# Patient Record
Sex: FEMALE | Race: WHITE | NOT HISPANIC OR LATINO
[De-identification: names, ages, dates, MRNs, and addresses within clinical notes are randomized per-mention and may not be internally consistent; named-entity substitution may affect disease eponyms.]

---

## 2017-02-05 ENCOUNTER — TRANSCRIPTION ENCOUNTER (OUTPATIENT)
Age: 72
End: 2017-02-05

## 2017-03-16 ENCOUNTER — APPOINTMENT (OUTPATIENT)
Dept: THORACIC SURGERY | Facility: CLINIC | Age: 72
End: 2017-03-16

## 2017-03-16 VITALS — WEIGHT: 155 LBS | BODY MASS INDEX: 27.46 KG/M2 | HEIGHT: 63 IN

## 2017-06-20 ENCOUNTER — APPOINTMENT (OUTPATIENT)
Dept: THORACIC SURGERY | Facility: CLINIC | Age: 72
End: 2017-06-20

## 2017-06-20 VITALS
HEIGHT: 63 IN | HEART RATE: 86 BPM | RESPIRATION RATE: 16 BRPM | BODY MASS INDEX: 27.46 KG/M2 | SYSTOLIC BLOOD PRESSURE: 144 MMHG | WEIGHT: 155 LBS | DIASTOLIC BLOOD PRESSURE: 74 MMHG | OXYGEN SATURATION: 94 %

## 2017-09-26 ENCOUNTER — APPOINTMENT (OUTPATIENT)
Dept: THORACIC SURGERY | Facility: CLINIC | Age: 72
End: 2017-09-26

## 2017-10-03 ENCOUNTER — APPOINTMENT (OUTPATIENT)
Dept: THORACIC SURGERY | Facility: CLINIC | Age: 72
End: 2017-10-03
Payer: MEDICARE

## 2017-10-03 VITALS
RESPIRATION RATE: 15 BRPM | WEIGHT: 152 LBS | SYSTOLIC BLOOD PRESSURE: 134 MMHG | HEIGHT: 63 IN | BODY MASS INDEX: 26.93 KG/M2 | HEART RATE: 72 BPM | OXYGEN SATURATION: 99 % | DIASTOLIC BLOOD PRESSURE: 84 MMHG | TEMPERATURE: 98.2 F

## 2017-10-03 PROCEDURE — 99215 OFFICE O/P EST HI 40 MIN: CPT

## 2017-10-03 RX ORDER — TRAZODONE HYDROCHLORIDE 100 MG/1
100 TABLET ORAL
Refills: 0 | Status: ACTIVE | COMMUNITY

## 2017-10-03 RX ORDER — ALPRAZOLAM 0.5 MG/1
0.5 TABLET ORAL
Refills: 0 | Status: ACTIVE | COMMUNITY

## 2018-01-16 ENCOUNTER — APPOINTMENT (OUTPATIENT)
Dept: THORACIC SURGERY | Facility: CLINIC | Age: 73
End: 2018-01-16
Payer: MEDICARE

## 2018-01-16 PROCEDURE — 99215 OFFICE O/P EST HI 40 MIN: CPT

## 2018-01-17 VITALS
SYSTOLIC BLOOD PRESSURE: 154 MMHG | OXYGEN SATURATION: 98 % | DIASTOLIC BLOOD PRESSURE: 80 MMHG | WEIGHT: 153 LBS | BODY MASS INDEX: 27.11 KG/M2 | HEIGHT: 63 IN | HEART RATE: 75 BPM

## 2018-04-17 ENCOUNTER — APPOINTMENT (OUTPATIENT)
Dept: THORACIC SURGERY | Facility: CLINIC | Age: 73
End: 2018-04-17
Payer: MEDICARE

## 2018-04-17 VITALS
DIASTOLIC BLOOD PRESSURE: 78 MMHG | HEART RATE: 64 BPM | HEIGHT: 63 IN | RESPIRATION RATE: 18 BRPM | SYSTOLIC BLOOD PRESSURE: 130 MMHG | WEIGHT: 153 LBS | OXYGEN SATURATION: 98 % | BODY MASS INDEX: 27.11 KG/M2

## 2018-04-17 PROCEDURE — 99213 OFFICE O/P EST LOW 20 MIN: CPT

## 2018-07-31 ENCOUNTER — APPOINTMENT (OUTPATIENT)
Dept: THORACIC SURGERY | Facility: CLINIC | Age: 73
End: 2018-07-31
Payer: MEDICARE

## 2018-07-31 VITALS
HEART RATE: 69 BPM | TEMPERATURE: 97.5 F | OXYGEN SATURATION: 98 % | WEIGHT: 155 LBS | BODY MASS INDEX: 27.46 KG/M2 | HEIGHT: 63 IN | SYSTOLIC BLOOD PRESSURE: 116 MMHG | DIASTOLIC BLOOD PRESSURE: 67 MMHG | RESPIRATION RATE: 18 BRPM

## 2018-07-31 PROCEDURE — 99213 OFFICE O/P EST LOW 20 MIN: CPT

## 2018-07-31 RX ORDER — AMLODIPINE BESYLATE 5 MG/1
5 TABLET ORAL
Refills: 0 | Status: ACTIVE | COMMUNITY

## 2018-10-01 ENCOUNTER — OUTPATIENT (OUTPATIENT)
Dept: OUTPATIENT SERVICES | Facility: HOSPITAL | Age: 73
LOS: 1 days | End: 2018-10-01
Payer: COMMERCIAL

## 2018-10-01 VITALS
WEIGHT: 154.1 LBS | RESPIRATION RATE: 16 BRPM | DIASTOLIC BLOOD PRESSURE: 66 MMHG | OXYGEN SATURATION: 99 % | SYSTOLIC BLOOD PRESSURE: 135 MMHG | HEIGHT: 62 IN | TEMPERATURE: 98 F | HEART RATE: 68 BPM

## 2018-10-01 DIAGNOSIS — Z01.818 ENCOUNTER FOR OTHER PREPROCEDURAL EXAMINATION: ICD-10-CM

## 2018-10-01 DIAGNOSIS — Z90.2 ACQUIRED ABSENCE OF LUNG [PART OF]: Chronic | ICD-10-CM

## 2018-10-01 DIAGNOSIS — M25.561 PAIN IN RIGHT KNEE: ICD-10-CM

## 2018-10-01 DIAGNOSIS — Z90.49 ACQUIRED ABSENCE OF OTHER SPECIFIED PARTS OF DIGESTIVE TRACT: Chronic | ICD-10-CM

## 2018-10-01 DIAGNOSIS — Z98.89 OTHER SPECIFIED POSTPROCEDURAL STATES: Chronic | ICD-10-CM

## 2018-10-01 DIAGNOSIS — M17.11 UNILATERAL PRIMARY OSTEOARTHRITIS, RIGHT KNEE: ICD-10-CM

## 2018-10-01 DIAGNOSIS — Z98.890 OTHER SPECIFIED POSTPROCEDURAL STATES: Chronic | ICD-10-CM

## 2018-10-01 LAB
ALBUMIN SERPL ELPH-MCNC: 3.9 G/DL — SIGNIFICANT CHANGE UP (ref 3.3–5)
ALP SERPL-CCNC: 78 U/L — SIGNIFICANT CHANGE UP (ref 40–120)
ALT FLD-CCNC: 23 U/L — SIGNIFICANT CHANGE UP (ref 12–78)
ANION GAP SERPL CALC-SCNC: 6 MMOL/L — SIGNIFICANT CHANGE UP (ref 5–17)
APTT BLD: 33 SEC — SIGNIFICANT CHANGE UP (ref 27.5–37.4)
AST SERPL-CCNC: 16 U/L — SIGNIFICANT CHANGE UP (ref 15–37)
BILIRUB SERPL-MCNC: 0.6 MG/DL — SIGNIFICANT CHANGE UP (ref 0.2–1.2)
BUN SERPL-MCNC: 21 MG/DL — SIGNIFICANT CHANGE UP (ref 7–23)
CALCIUM SERPL-MCNC: 8.6 MG/DL — SIGNIFICANT CHANGE UP (ref 8.5–10.1)
CHLORIDE SERPL-SCNC: 106 MMOL/L — SIGNIFICANT CHANGE UP (ref 96–108)
CO2 SERPL-SCNC: 29 MMOL/L — SIGNIFICANT CHANGE UP (ref 22–31)
CREAT SERPL-MCNC: 0.72 MG/DL — SIGNIFICANT CHANGE UP (ref 0.5–1.3)
GLUCOSE SERPL-MCNC: 118 MG/DL — HIGH (ref 70–99)
HBA1C BLD-MCNC: 5.7 % — HIGH (ref 4–5.6)
HCT VFR BLD CALC: 38.7 % — SIGNIFICANT CHANGE UP (ref 34.5–45)
HGB BLD-MCNC: 13.1 G/DL — SIGNIFICANT CHANGE UP (ref 11.5–15.5)
INR BLD: 1.03 RATIO — SIGNIFICANT CHANGE UP (ref 0.88–1.16)
MCHC RBC-ENTMCNC: 30.9 PG — SIGNIFICANT CHANGE UP (ref 27–34)
MCHC RBC-ENTMCNC: 33.9 GM/DL — SIGNIFICANT CHANGE UP (ref 32–36)
MCV RBC AUTO: 91.3 FL — SIGNIFICANT CHANGE UP (ref 80–100)
MRSA PCR RESULT.: SIGNIFICANT CHANGE UP
NRBC # BLD: 0 /100 WBCS — SIGNIFICANT CHANGE UP (ref 0–0)
PLATELET # BLD AUTO: 230 K/UL — SIGNIFICANT CHANGE UP (ref 150–400)
POTASSIUM SERPL-MCNC: 3.9 MMOL/L — SIGNIFICANT CHANGE UP (ref 3.5–5.3)
POTASSIUM SERPL-SCNC: 3.9 MMOL/L — SIGNIFICANT CHANGE UP (ref 3.5–5.3)
PROT SERPL-MCNC: 7.4 G/DL — SIGNIFICANT CHANGE UP (ref 6–8.3)
PROTHROM AB SERPL-ACNC: 11.2 SEC — SIGNIFICANT CHANGE UP (ref 9.8–12.7)
RBC # BLD: 4.24 M/UL — SIGNIFICANT CHANGE UP (ref 3.8–5.2)
RBC # FLD: 12.5 % — SIGNIFICANT CHANGE UP (ref 10.3–14.5)
S AUREUS DNA NOSE QL NAA+PROBE: SIGNIFICANT CHANGE UP
SODIUM SERPL-SCNC: 141 MMOL/L — SIGNIFICANT CHANGE UP (ref 135–145)
WBC # BLD: 7.8 K/UL — SIGNIFICANT CHANGE UP (ref 3.8–10.5)
WBC # FLD AUTO: 7.8 K/UL — SIGNIFICANT CHANGE UP (ref 3.8–10.5)

## 2018-10-01 PROCEDURE — 36415 COLL VENOUS BLD VENIPUNCTURE: CPT

## 2018-10-01 PROCEDURE — 73560 X-RAY EXAM OF KNEE 1 OR 2: CPT | Mod: 26,RT

## 2018-10-01 PROCEDURE — 86901 BLOOD TYPING SEROLOGIC RH(D): CPT

## 2018-10-01 PROCEDURE — 85610 PROTHROMBIN TIME: CPT

## 2018-10-01 PROCEDURE — 86900 BLOOD TYPING SEROLOGIC ABO: CPT

## 2018-10-01 PROCEDURE — 87640 STAPH A DNA AMP PROBE: CPT

## 2018-10-01 PROCEDURE — 73560 X-RAY EXAM OF KNEE 1 OR 2: CPT

## 2018-10-01 PROCEDURE — 85730 THROMBOPLASTIN TIME PARTIAL: CPT

## 2018-10-01 PROCEDURE — 93005 ELECTROCARDIOGRAM TRACING: CPT

## 2018-10-01 PROCEDURE — 83036 HEMOGLOBIN GLYCOSYLATED A1C: CPT

## 2018-10-01 PROCEDURE — 93010 ELECTROCARDIOGRAM REPORT: CPT | Mod: NC

## 2018-10-01 PROCEDURE — 85027 COMPLETE CBC AUTOMATED: CPT

## 2018-10-01 PROCEDURE — 87641 MR-STAPH DNA AMP PROBE: CPT

## 2018-10-01 PROCEDURE — 80053 COMPREHEN METABOLIC PANEL: CPT

## 2018-10-01 PROCEDURE — 86850 RBC ANTIBODY SCREEN: CPT

## 2018-10-01 PROCEDURE — G0463: CPT

## 2018-10-01 NOTE — H&P PST ADULT - NEGATIVE NEUROLOGICAL SYMPTOMS
no tremors/no paresthesias/no vertigo/no loss of sensation/no hemiparesis/no syncope/no difficulty walking/no confusion/no loss of consciousness/no generalized seizures/no transient paralysis/no focal seizures/no weakness/no headache/no facial palsy

## 2018-10-01 NOTE — H&P PST ADULT - NEGATIVE GENERAL SYMPTOMS
no chills/no weight gain/no polyphagia/no fatigue/no fever/no polydipsia/no sweating/no anorexia/no weight loss/no polyuria/no malaise

## 2018-10-01 NOTE — H&P PST ADULT - PSH
H/O endoscopy  April 2015  S/P cholecystectomy  25 years ago  S/P excision of lipoma  8/2015  S/P lobectomy of lung  right lobe with 2 nodules

## 2018-10-01 NOTE — H&P PST ADULT - HISTORY OF PRESENT ILLNESS
72 y/o female, PMH of HTN, hyperlipidemia,  Anxiety, and acid reflux, in PST with c/o pain in the right knee for a while. Also had a tumor on th right thigh which was removed 3 yrs ago, now in PST, scheduled for right total knee replacement.

## 2018-10-01 NOTE — H&P PST ADULT - NEGATIVE GASTROINTESTINAL SYMPTOMS
no constipation/no change in bowel habits/no abdominal pain/no nausea/no vomiting/no melena/no diarrhea

## 2018-10-01 NOTE — H&P PST ADULT - NSANTHOSAYNRD_GEN_A_CORE
No. JOSIAH screening performed.  STOP BANG Legend: 0-2 = LOW Risk; 3-4 = INTERMEDIATE Risk; 5-8 = HIGH Risk

## 2018-10-01 NOTE — H&P PST ADULT - NEGATIVE SKIN SYMPTOMS
no dryness/no brittle nails/no itching/no tumor/no hair loss/no change in size/color of mole/no pitted nails/no rash

## 2018-10-01 NOTE — H&P PST ADULT - ASSESSMENT
74 y/o female, PMH of HTN, hyperlipidemia,  Anxiety, and acid reflux, in PST with c/o pain in the right knee for a while. Also had a tumor on th right thigh which was removed 3 yrs ago, now in PST, scheduled for right total knee replacement.

## 2018-10-01 NOTE — H&P PST ADULT - PMH
Anxiety    Lal's esophagus    GERD (gastroesophageal reflux disease)    Hiatal hernia    Hyperlipidemia    IBS (irritable bowel syndrome)    Parotid gland enlargement  h/o biopsy 4 years ago and MRI 2015 with no changes  Postnasal drip    Solitary pulmonary nodule

## 2018-10-13 RX ORDER — FOLIC ACID 0.8 MG
1 TABLET ORAL DAILY
Qty: 0 | Refills: 0 | Status: DISCONTINUED | OUTPATIENT
Start: 2018-10-15 | End: 2018-10-18

## 2018-10-13 RX ORDER — FERROUS SULFATE 325(65) MG
325 TABLET ORAL
Qty: 0 | Refills: 0 | Status: DISCONTINUED | OUTPATIENT
Start: 2018-10-15 | End: 2018-10-18

## 2018-10-13 RX ORDER — ASCORBIC ACID 60 MG
500 TABLET,CHEWABLE ORAL
Qty: 0 | Refills: 0 | Status: DISCONTINUED | OUTPATIENT
Start: 2018-10-15 | End: 2018-10-18

## 2018-10-13 RX ORDER — DOCUSATE SODIUM 100 MG
100 CAPSULE ORAL THREE TIMES A DAY
Qty: 0 | Refills: 0 | Status: DISCONTINUED | OUTPATIENT
Start: 2018-10-15 | End: 2018-10-18

## 2018-10-13 RX ORDER — CELECOXIB 200 MG/1
200 CAPSULE ORAL
Qty: 0 | Refills: 0 | Status: DISCONTINUED | OUTPATIENT
Start: 2018-10-17 | End: 2018-10-18

## 2018-10-13 RX ORDER — ASPIRIN/CALCIUM CARB/MAGNESIUM 324 MG
325 TABLET ORAL
Qty: 0 | Refills: 0 | Status: DISCONTINUED | OUTPATIENT
Start: 2018-10-15 | End: 2018-10-18

## 2018-10-13 RX ORDER — HYDROMORPHONE HYDROCHLORIDE 2 MG/ML
2 INJECTION INTRAMUSCULAR; INTRAVENOUS; SUBCUTANEOUS EVERY 4 HOURS
Qty: 0 | Refills: 0 | Status: DISCONTINUED | OUTPATIENT
Start: 2018-10-15 | End: 2018-10-18

## 2018-10-13 RX ORDER — ACETAMINOPHEN 500 MG
650 TABLET ORAL EVERY 8 HOURS
Qty: 0 | Refills: 0 | Status: DISCONTINUED | OUTPATIENT
Start: 2018-10-16 | End: 2018-10-18

## 2018-10-13 RX ORDER — PANTOPRAZOLE SODIUM 20 MG/1
40 TABLET, DELAYED RELEASE ORAL DAILY
Qty: 0 | Refills: 0 | Status: DISCONTINUED | OUTPATIENT
Start: 2018-10-15 | End: 2018-10-18

## 2018-10-13 RX ORDER — HYDROMORPHONE HYDROCHLORIDE 2 MG/ML
4 INJECTION INTRAMUSCULAR; INTRAVENOUS; SUBCUTANEOUS EVERY 4 HOURS
Qty: 0 | Refills: 0 | Status: DISCONTINUED | OUTPATIENT
Start: 2018-10-15 | End: 2018-10-18

## 2018-10-13 RX ORDER — ONDANSETRON 8 MG/1
4 TABLET, FILM COATED ORAL EVERY 6 HOURS
Qty: 0 | Refills: 0 | Status: DISCONTINUED | OUTPATIENT
Start: 2018-10-15 | End: 2018-10-18

## 2018-10-13 RX ORDER — ATORVASTATIN CALCIUM 80 MG/1
10 TABLET, FILM COATED ORAL AT BEDTIME
Qty: 0 | Refills: 0 | Status: DISCONTINUED | OUTPATIENT
Start: 2018-10-15 | End: 2018-10-18

## 2018-10-13 RX ORDER — MORPHINE SULFATE 50 MG/1
2 CAPSULE, EXTENDED RELEASE ORAL EVERY 4 HOURS
Qty: 0 | Refills: 0 | Status: DISCONTINUED | OUTPATIENT
Start: 2018-10-15 | End: 2018-10-18

## 2018-10-14 ENCOUNTER — TRANSCRIPTION ENCOUNTER (OUTPATIENT)
Age: 73
End: 2018-10-14

## 2018-10-15 ENCOUNTER — RESULT REVIEW (OUTPATIENT)
Age: 73
End: 2018-10-15

## 2018-10-15 ENCOUNTER — INPATIENT (INPATIENT)
Facility: HOSPITAL | Age: 73
LOS: 2 days | Discharge: HOME | DRG: 470 | End: 2018-10-18
Attending: ORTHOPAEDIC SURGERY | Admitting: ORTHOPAEDIC SURGERY
Payer: COMMERCIAL

## 2018-10-15 VITALS
DIASTOLIC BLOOD PRESSURE: 58 MMHG | WEIGHT: 154.1 LBS | SYSTOLIC BLOOD PRESSURE: 115 MMHG | OXYGEN SATURATION: 95 % | TEMPERATURE: 98 F | RESPIRATION RATE: 14 BRPM | HEIGHT: 62 IN | HEART RATE: 68 BPM

## 2018-10-15 DIAGNOSIS — Z90.2 ACQUIRED ABSENCE OF LUNG [PART OF]: Chronic | ICD-10-CM

## 2018-10-15 DIAGNOSIS — F41.9 ANXIETY DISORDER, UNSPECIFIED: ICD-10-CM

## 2018-10-15 DIAGNOSIS — M17.11 UNILATERAL PRIMARY OSTEOARTHRITIS, RIGHT KNEE: ICD-10-CM

## 2018-10-15 DIAGNOSIS — Z98.89 OTHER SPECIFIED POSTPROCEDURAL STATES: Chronic | ICD-10-CM

## 2018-10-15 DIAGNOSIS — Z98.890 OTHER SPECIFIED POSTPROCEDURAL STATES: Chronic | ICD-10-CM

## 2018-10-15 DIAGNOSIS — I10 ESSENTIAL (PRIMARY) HYPERTENSION: ICD-10-CM

## 2018-10-15 DIAGNOSIS — K21.9 GASTRO-ESOPHAGEAL REFLUX DISEASE WITHOUT ESOPHAGITIS: ICD-10-CM

## 2018-10-15 DIAGNOSIS — Z90.49 ACQUIRED ABSENCE OF OTHER SPECIFIED PARTS OF DIGESTIVE TRACT: Chronic | ICD-10-CM

## 2018-10-15 LAB
ABO RH CONFIRMATION: SIGNIFICANT CHANGE UP
HCT VFR BLD CALC: 36.8 % — SIGNIFICANT CHANGE UP (ref 34.5–45)
HGB BLD-MCNC: 12.1 G/DL — SIGNIFICANT CHANGE UP (ref 11.5–15.5)
MCHC RBC-ENTMCNC: 31 PG — SIGNIFICANT CHANGE UP (ref 27–34)
MCHC RBC-ENTMCNC: 32.9 GM/DL — SIGNIFICANT CHANGE UP (ref 32–36)
MCV RBC AUTO: 94.4 FL — SIGNIFICANT CHANGE UP (ref 80–100)
NRBC # BLD: 0 /100 WBCS — SIGNIFICANT CHANGE UP (ref 0–0)
PLATELET # BLD AUTO: 197 K/UL — SIGNIFICANT CHANGE UP (ref 150–400)
RBC # BLD: 3.9 M/UL — SIGNIFICANT CHANGE UP (ref 3.8–5.2)
RBC # FLD: 12.1 % — SIGNIFICANT CHANGE UP (ref 10.3–14.5)
WBC # BLD: 10.85 K/UL — HIGH (ref 3.8–10.5)
WBC # FLD AUTO: 10.85 K/UL — HIGH (ref 3.8–10.5)

## 2018-10-15 PROCEDURE — 73562 X-RAY EXAM OF KNEE 3: CPT | Mod: 26,RT

## 2018-10-15 PROCEDURE — 88305 TISSUE EXAM BY PATHOLOGIST: CPT | Mod: 26

## 2018-10-15 PROCEDURE — 88311 DECALCIFY TISSUE: CPT | Mod: 26

## 2018-10-15 RX ORDER — CEFAZOLIN SODIUM 1 G
2000 VIAL (EA) INJECTION ONCE
Qty: 0 | Refills: 0 | Status: COMPLETED | OUTPATIENT
Start: 2018-10-15 | End: 2018-10-15

## 2018-10-15 RX ORDER — ACETAMINOPHEN 500 MG
1000 TABLET ORAL ONCE
Qty: 0 | Refills: 0 | Status: COMPLETED | OUTPATIENT
Start: 2018-10-16 | End: 2018-10-16

## 2018-10-15 RX ORDER — TRAZODONE HCL 50 MG
100 TABLET ORAL AT BEDTIME
Qty: 0 | Refills: 0 | Status: DISCONTINUED | OUTPATIENT
Start: 2018-10-15 | End: 2018-10-18

## 2018-10-15 RX ORDER — SODIUM CHLORIDE 9 MG/ML
1000 INJECTION, SOLUTION INTRAVENOUS
Qty: 0 | Refills: 0 | Status: DISCONTINUED | OUTPATIENT
Start: 2018-10-15 | End: 2018-10-15

## 2018-10-15 RX ORDER — HYDROMORPHONE HYDROCHLORIDE 2 MG/ML
0.5 INJECTION INTRAMUSCULAR; INTRAVENOUS; SUBCUTANEOUS
Qty: 0 | Refills: 0 | Status: DISCONTINUED | OUTPATIENT
Start: 2018-10-15 | End: 2018-10-15

## 2018-10-15 RX ORDER — AMLODIPINE BESYLATE 2.5 MG/1
5 TABLET ORAL DAILY
Qty: 0 | Refills: 0 | Status: DISCONTINUED | OUTPATIENT
Start: 2018-10-15 | End: 2018-10-18

## 2018-10-15 RX ORDER — CEFAZOLIN SODIUM 1 G
2000 VIAL (EA) INJECTION EVERY 8 HOURS
Qty: 0 | Refills: 0 | Status: COMPLETED | OUTPATIENT
Start: 2018-10-15 | End: 2018-10-16

## 2018-10-15 RX ORDER — ALPRAZOLAM 0.25 MG
0.25 TABLET ORAL ONCE
Qty: 0 | Refills: 0 | Status: DISCONTINUED | OUTPATIENT
Start: 2018-10-15 | End: 2018-10-18

## 2018-10-15 RX ORDER — METOCLOPRAMIDE HCL 10 MG
10 TABLET ORAL ONCE
Qty: 0 | Refills: 0 | Status: COMPLETED | OUTPATIENT
Start: 2018-10-15 | End: 2018-10-15

## 2018-10-15 RX ORDER — BUPIVACAINE 13.3 MG/ML
20 INJECTION, SUSPENSION, LIPOSOMAL INFILTRATION ONCE
Qty: 0 | Refills: 0 | Status: COMPLETED | OUTPATIENT
Start: 2018-10-15 | End: 2018-10-15

## 2018-10-15 RX ORDER — ACETAMINOPHEN 500 MG
1000 TABLET ORAL ONCE
Qty: 0 | Refills: 0 | Status: COMPLETED | OUTPATIENT
Start: 2018-10-15 | End: 2018-10-15

## 2018-10-15 RX ORDER — SODIUM CHLORIDE 9 MG/ML
1000 INJECTION, SOLUTION INTRAVENOUS
Qty: 0 | Refills: 0 | Status: DISCONTINUED | OUTPATIENT
Start: 2018-10-15 | End: 2018-10-16

## 2018-10-15 RX ORDER — ALPRAZOLAM 0.25 MG
0.5 TABLET ORAL AT BEDTIME
Qty: 0 | Refills: 0 | Status: DISCONTINUED | OUTPATIENT
Start: 2018-10-15 | End: 2018-10-18

## 2018-10-15 RX ADMIN — SODIUM CHLORIDE 110 MILLILITER(S): 9 INJECTION, SOLUTION INTRAVENOUS at 16:23

## 2018-10-15 RX ADMIN — Medication 500 MILLIGRAM(S): at 18:15

## 2018-10-15 RX ADMIN — Medication 100 MILLIGRAM(S): at 23:28

## 2018-10-15 RX ADMIN — Medication 400 MILLIGRAM(S): at 20:55

## 2018-10-15 RX ADMIN — Medication 10 MILLIGRAM(S): at 16:43

## 2018-10-15 RX ADMIN — Medication 1000 MILLIGRAM(S): at 21:24

## 2018-10-15 RX ADMIN — SODIUM CHLORIDE 75 MILLILITER(S): 9 INJECTION, SOLUTION INTRAVENOUS at 18:17

## 2018-10-15 RX ADMIN — ATORVASTATIN CALCIUM 10 MILLIGRAM(S): 80 TABLET, FILM COATED ORAL at 21:03

## 2018-10-15 RX ADMIN — Medication 325 MILLIGRAM(S): at 18:15

## 2018-10-15 RX ADMIN — Medication 100 MILLIGRAM(S): at 21:03

## 2018-10-15 RX ADMIN — HYDROMORPHONE HYDROCHLORIDE 4 MILLIGRAM(S): 2 INJECTION INTRAMUSCULAR; INTRAVENOUS; SUBCUTANEOUS at 23:35

## 2018-10-15 RX ADMIN — AMLODIPINE BESYLATE 5 MILLIGRAM(S): 2.5 TABLET ORAL at 21:03

## 2018-10-15 RX ADMIN — Medication 0.5 MILLIGRAM(S): at 23:28

## 2018-10-15 RX ADMIN — SODIUM CHLORIDE 75 MILLILITER(S): 9 INJECTION, SOLUTION INTRAVENOUS at 11:21

## 2018-10-15 RX ADMIN — Medication 325 MILLIGRAM(S): at 18:16

## 2018-10-15 NOTE — CONSULT NOTE ADULT - SUBJECTIVE AND OBJECTIVE BOX
Patient is a 73y old  Female who presents with a chief complaint of R TKA (15 Oct 2018 16:58)  feels well, without complaints      INTERVAL HPI/OVERNIGHT EVENTS:  T(C): 36.6 (10-15-18 @ 17:41), Max: 36.7 (10-15-18 @ 15:12)  HR: 75 (10-15-18 @ 17:41) (63 - 88)  BP: 112/68 (10-15-18 @ 17:41) (106/83 - 129/60)  RR: 17 (10-15-18 @ 17:41) (12 - 17)  SpO2: 97% (10-15-18 @ 17:42) (86% - 99%)  Wt(kg): --  I&O's Summary    15 Oct 2018 07:01  -  15 Oct 2018 19:44  --------------------------------------------------------  IN: 645 mL / OUT: 50 mL / NET: 595 mL        PAST MEDICAL & SURGICAL HISTORY:  Parotid gland enlargement: h/o biopsy 4 years ago and MRI 2015 with no changes  Postnasal drip  Solitary pulmonary nodule  Hiatal hernia  Anxiety  IBS (irritable bowel syndrome)  Hyperlipidemia  GERD (gastroesophageal reflux disease)  Lal's esophagus  S/P lobectomy of lung: right lobe with 2 nodules  S/P excision of lipoma: 8/2015  S/P cholecystectomy: 25 years ago  H/O endoscopy: April 2015      SOCIAL HISTORY  Alcohol: neg  Tobacco: neg  Illicit substance use: neg      FAMILY HISTORY:    Home Medications:  acetaminophen 325 mg oral tablet: 2 tab(s) orally every 6 hours, As needed, Mild Pain (1 - 3) (15 Oct 2018 10:03)  ALPRAZolam 0.5 mg oral tablet: 1 tab(s) orally once a day, As needed, anxiety (15 Oct 2018 10:03)  amLODIPine 5 mg oral tablet: 1 tab(s) orally once a day (15 Oct 2018 10:03)  atorvastatin 10 mg oral tablet: 1 tab(s) orally once a day (at bedtime) (15 Oct 2018 10:03)  Motrin 800 mg oral tablet: as needed (15 Oct 2018 10:03)  multivitamin: 1 tab(s) orally once a day  last dose to be 11/14/2016 (15 Oct 2018 10:03)  pantoprazole 40 mg oral delayed release tablet: 1 tab(s) orally 4x/week (15 Oct 2018 10:03)  Probiotic Formula oral capsule: 1 cap(s) orally once a day (15 Oct 2018 10:03)  traZODone 100 mg oral tablet: orally once a day (at bedtime) (15 Oct 2018 10:03)  Tylenol Allergy Sinus Caplet: 1 tab(s)  every 4 hours, As Needed (15 Oct 2018 10:03)        LABS:                        12.1   10.85 )-----------( 197      ( 15 Oct 2018 18:07 )             36.8               CAPILLARY BLOOD GLUCOSE      POCT Blood Glucose.: 110 mg/dL (15 Oct 2018 15:22)  POCT Blood Glucose.: 98 mg/dL (15 Oct 2018 09:52)            MEDICATIONS  (STANDING):  acetaminophen  IVPB .. 1000 milliGRAM(s) IV Intermittent once  ALPRAZolam 0.5 milliGRAM(s) Oral at bedtime  amLODIPine   Tablet 5 milliGRAM(s) Oral daily  ascorbic acid 500 milliGRAM(s) Oral two times a day  aspirin enteric coated 325 milliGRAM(s) Oral two times a day  atorvastatin 10 milliGRAM(s) Oral at bedtime  ceFAZolin   IVPB 2000 milliGRAM(s) IV Intermittent every 8 hours  docusate sodium 100 milliGRAM(s) Oral three times a day  ferrous    sulfate 325 milliGRAM(s) Oral three times a day with meals  folic acid 1 milliGRAM(s) Oral daily  lactated ringers. 1000 milliLiter(s) (75 mL/Hr) IV Continuous <Continuous>  multivitamin 1 Tablet(s) Oral daily  pantoprazole    Tablet 40 milliGRAM(s) Oral daily  traZODone 100 milliGRAM(s) Oral at bedtime    MEDICATIONS  (PRN):  ALPRAZolam 0.25 milliGRAM(s) Oral once PRN anxiety  HYDROmorphone   Tablet 2 milliGRAM(s) Oral every 4 hours PRN Moderate Pain (4 - 6)  HYDROmorphone   Tablet 4 milliGRAM(s) Oral every 4 hours PRN Severe Pain (7 - 10)  morphine  - Injectable 2 milliGRAM(s) IV Push every 4 hours PRN Severe Pain (7 - 10)  ondansetron Injectable 4 milliGRAM(s) IV Push every 6 hours PRN Nausea and/or Vomiting      REVIEW OF SYSTEMS:  CONSTITUTIONAL: No fever, weight loss, or fatigue  EYES: No eye pain, visual disturbances, or discharge  ENMT:  No difficulty hearing, tinnitus, vertigo; No sinus or throat pain  NECK: No pain or stiffness  RESPIRATORY: No cough, wheezing, chills or hemoptysis; No shortness of breath  CARDIOVASCULAR: No chest pain, palpitations, dizziness, or leg swelling  GASTROINTESTINAL: No abdominal or epigastric pain. No nausea, vomiting, or hematemesis; No diarrhea or constipation. No melena or hematochezia.  GENITOURINARY: No dysuria, frequency, hematuria, or incontinence  NEUROLOGICAL: No headaches, memory loss, loss of strength, numbness, or tremors  SKIN: No itching, burning, rashes, or lesions   LYMPH NODES: No enlarged glands  ENDOCRINE: No heat or cold intolerance; No hair loss  MUSCULOSKELETAL: chronic r knee pain  PSYCHIATRIC: No depression, anxiety, mood swings, or difficulty sleeping  HEME/LYMPH: No easy bruising, or bleeding gums  ALLERY AND IMMUNOLOGIC: No hives or eczema    RADIOLOGY & ADDITIONAL TESTS:    Imaging Personally Reviewed:  [ ] YES  [ ] NO    Consultant(s) Notes Reviewed:  [ ] YES  [ ] NO        PHYSICAL EXAM:  GENERAL: NAD, well-groomed, well-developed  HEAD:  Atraumatic, Normocephalic  EYES: EOMI, PERRLA, conjunctiva and sclera clear  ENMT: No tonsillar erythema, exudates, or enlargement; Moist mucous membranes, Good dentition, No lesions  NECK: Supple, No JVD, Normal thyroid  NERVOUS SYSTEM:  Alert & Oriented X3, Good concentration; Motor Strength 5/5 B/L upper and lower extremities; DTRs 2+ intact and symmetric  CHEST/LUNG: Clear to percussion bilaterally; No rales, rhonchi, wheezing, or rubs  HEART: Regular rate and rhythm; No murmurs, rubs, or gallops  ABDOMEN: Soft, Nontender, Nondistended; Bowel sounds present  EXTREMITIES:  2+ Peripheral Pulses, No clubbing, cyanosis, or edema  LYMPH: No lymphadenopathy noted  SKIN: No rashes or lesions    Care Discussed with Consultants/Other Providers [ ] YES  [ ] NO

## 2018-10-15 NOTE — PHYSICAL THERAPY INITIAL EVALUATION ADULT - ADDITIONAL COMMENTS
Pt lives with family in private home, 0 RYAN.  Pt reports she was independent in all ADLs prior surgery, ambulated without an assistive device, has tub shower, no seat, no grab bars. Pt owns SAC and RW

## 2018-10-15 NOTE — PROGRESS NOTE ADULT - ASSESSMENT
A/P: 73 y F s/p R TKA POD 0    Analgesia  DVT ppx  WBAT RLE with assistive devices as needed  Encourage IS  FU labs  PT/OT  DC planning    Will discuss with attending and advise if plan changes.

## 2018-10-15 NOTE — PROGRESS NOTE ADULT - SUBJECTIVE AND OBJECTIVE BOX
Post operative check    Patient seen and examined at bedside. Pain is well controlled. Patient tolerated procedure well. No other complaints at this time.         PE:  Vital Signs Last 24 Hrs  T(C): 36.6 (15 Oct 2018 16:10), Max: 36.7 (15 Oct 2018 15:12)  T(F): 97.9 (15 Oct 2018 16:10), Max: 98.1 (15 Oct 2018 15:12)  HR: 63 (15 Oct 2018 16:25) (63 - 88)  BP: 106/83 (15 Oct 2018 16:25) (106/83 - 129/60)  RR: 14 (15 Oct 2018 16:25) (12 - 15)  SpO2: 96% (15 Oct 2018 16:25) (95% - 99%)    GEN: NAD, Resting comfortably    RLE :    Aquacel dressing c/d/i  SILT L3-S1  EHL/FHL/TA/GSC intact   DP pulse 2+  Compartments soft and compressible  no calf tenderness

## 2018-10-15 NOTE — CHART NOTE - NSCHARTNOTEFT_GEN_A_CORE
Was called to examine patient after a one time 02 Sat reading of 89. Patient seen and examined at bedside. Patient was taken off 2L NC and for 5 minutes. During the whole duration while talking to the patient, Vitals were taken which showed HR in the 80's and 02 sat above 99 off of NC. Patient has a hx of Left Lower Lobe lobectomy 2 years ago, however denies having to use oxygen at home. Patient currently denies SOB, Chest pain. No calf tenderness b/l. Will continue to monitor and follow.

## 2018-10-15 NOTE — PHYSICAL THERAPY INITIAL EVALUATION ADULT - RANGE OF MOTION EXAMINATION, REHAB EVAL
R Knee: 0-90/bilateral upper extremity ROM was WNL (within normal limits)/Left LE ROM was WFL (within functional limits)

## 2018-10-15 NOTE — BRIEF OPERATIVE NOTE - PROCEDURE
<<-----Click on this checkbox to enter Procedure Total knee arthroplasty  10/15/2018    Active  KATYA

## 2018-10-16 ENCOUNTER — TRANSCRIPTION ENCOUNTER (OUTPATIENT)
Age: 73
End: 2018-10-16

## 2018-10-16 LAB
ANION GAP SERPL CALC-SCNC: 7 MMOL/L — SIGNIFICANT CHANGE UP (ref 5–17)
BUN SERPL-MCNC: 12 MG/DL — SIGNIFICANT CHANGE UP (ref 7–23)
CALCIUM SERPL-MCNC: 8.5 MG/DL — SIGNIFICANT CHANGE UP (ref 8.5–10.1)
CHLORIDE SERPL-SCNC: 104 MMOL/L — SIGNIFICANT CHANGE UP (ref 96–108)
CO2 SERPL-SCNC: 27 MMOL/L — SIGNIFICANT CHANGE UP (ref 22–31)
CREAT SERPL-MCNC: 0.67 MG/DL — SIGNIFICANT CHANGE UP (ref 0.5–1.3)
GLUCOSE SERPL-MCNC: 142 MG/DL — HIGH (ref 70–99)
HCT VFR BLD CALC: 35.7 % — SIGNIFICANT CHANGE UP (ref 34.5–45)
HGB BLD-MCNC: 12.1 G/DL — SIGNIFICANT CHANGE UP (ref 11.5–15.5)
MCHC RBC-ENTMCNC: 30.9 PG — SIGNIFICANT CHANGE UP (ref 27–34)
MCHC RBC-ENTMCNC: 33.9 GM/DL — SIGNIFICANT CHANGE UP (ref 32–36)
MCV RBC AUTO: 91.1 FL — SIGNIFICANT CHANGE UP (ref 80–100)
NRBC # BLD: 0 /100 WBCS — SIGNIFICANT CHANGE UP (ref 0–0)
PLATELET # BLD AUTO: 243 K/UL — SIGNIFICANT CHANGE UP (ref 150–400)
POTASSIUM SERPL-MCNC: 4.7 MMOL/L — SIGNIFICANT CHANGE UP (ref 3.5–5.3)
POTASSIUM SERPL-SCNC: 4.7 MMOL/L — SIGNIFICANT CHANGE UP (ref 3.5–5.3)
RBC # BLD: 3.92 M/UL — SIGNIFICANT CHANGE UP (ref 3.8–5.2)
RBC # FLD: 11.9 % — SIGNIFICANT CHANGE UP (ref 10.3–14.5)
SODIUM SERPL-SCNC: 138 MMOL/L — SIGNIFICANT CHANGE UP (ref 135–145)
WBC # BLD: 9.19 K/UL — SIGNIFICANT CHANGE UP (ref 3.8–10.5)
WBC # FLD AUTO: 9.19 K/UL — SIGNIFICANT CHANGE UP (ref 3.8–10.5)

## 2018-10-16 RX ORDER — SODIUM CHLORIDE 9 MG/ML
1000 INJECTION INTRAMUSCULAR; INTRAVENOUS; SUBCUTANEOUS
Qty: 0 | Refills: 0 | Status: DISCONTINUED | OUTPATIENT
Start: 2018-10-16 | End: 2018-10-17

## 2018-10-16 RX ADMIN — ONDANSETRON 4 MILLIGRAM(S): 8 TABLET, FILM COATED ORAL at 08:33

## 2018-10-16 RX ADMIN — HYDROMORPHONE HYDROCHLORIDE 4 MILLIGRAM(S): 2 INJECTION INTRAMUSCULAR; INTRAVENOUS; SUBCUTANEOUS at 14:10

## 2018-10-16 RX ADMIN — Medication 0.5 MILLIGRAM(S): at 21:44

## 2018-10-16 RX ADMIN — Medication 100 MILLIGRAM(S): at 05:37

## 2018-10-16 RX ADMIN — Medication 100 MILLIGRAM(S): at 13:27

## 2018-10-16 RX ADMIN — Medication 1000 MILLIGRAM(S): at 12:37

## 2018-10-16 RX ADMIN — HYDROMORPHONE HYDROCHLORIDE 4 MILLIGRAM(S): 2 INJECTION INTRAMUSCULAR; INTRAVENOUS; SUBCUTANEOUS at 08:32

## 2018-10-16 RX ADMIN — Medication 100 MILLIGRAM(S): at 21:44

## 2018-10-16 RX ADMIN — Medication 650 MILLIGRAM(S): at 22:15

## 2018-10-16 RX ADMIN — AMLODIPINE BESYLATE 5 MILLIGRAM(S): 2.5 TABLET ORAL at 21:44

## 2018-10-16 RX ADMIN — HYDROMORPHONE HYDROCHLORIDE 4 MILLIGRAM(S): 2 INJECTION INTRAMUSCULAR; INTRAVENOUS; SUBCUTANEOUS at 15:05

## 2018-10-16 RX ADMIN — HYDROMORPHONE HYDROCHLORIDE 4 MILLIGRAM(S): 2 INJECTION INTRAMUSCULAR; INTRAVENOUS; SUBCUTANEOUS at 00:05

## 2018-10-16 RX ADMIN — Medication 650 MILLIGRAM(S): at 21:44

## 2018-10-16 RX ADMIN — Medication 325 MILLIGRAM(S): at 08:32

## 2018-10-16 RX ADMIN — Medication 325 MILLIGRAM(S): at 18:27

## 2018-10-16 RX ADMIN — SODIUM CHLORIDE 100 MILLILITER(S): 9 INJECTION INTRAMUSCULAR; INTRAVENOUS; SUBCUTANEOUS at 14:14

## 2018-10-16 RX ADMIN — Medication 400 MILLIGRAM(S): at 12:13

## 2018-10-16 RX ADMIN — Medication 325 MILLIGRAM(S): at 05:37

## 2018-10-16 RX ADMIN — HYDROMORPHONE HYDROCHLORIDE 4 MILLIGRAM(S): 2 INJECTION INTRAMUSCULAR; INTRAVENOUS; SUBCUTANEOUS at 09:30

## 2018-10-16 RX ADMIN — Medication 500 MILLIGRAM(S): at 18:27

## 2018-10-16 RX ADMIN — Medication 325 MILLIGRAM(S): at 12:08

## 2018-10-16 RX ADMIN — ATORVASTATIN CALCIUM 10 MILLIGRAM(S): 80 TABLET, FILM COATED ORAL at 21:44

## 2018-10-16 RX ADMIN — Medication 1 TABLET(S): at 12:08

## 2018-10-16 RX ADMIN — PANTOPRAZOLE SODIUM 40 MILLIGRAM(S): 20 TABLET, DELAYED RELEASE ORAL at 12:08

## 2018-10-16 RX ADMIN — Medication 1 MILLIGRAM(S): at 12:08

## 2018-10-16 RX ADMIN — ONDANSETRON 4 MILLIGRAM(S): 8 TABLET, FILM COATED ORAL at 14:11

## 2018-10-16 RX ADMIN — Medication 500 MILLIGRAM(S): at 05:37

## 2018-10-16 NOTE — PROGRESS NOTE ADULT - SUBJECTIVE AND OBJECTIVE BOX
The patient was interviewed and evaluated. OOB    73y Female    T(C): 37.1 (10-16-18 @ 07:24), Max: 37.1 (10-16-18 @ 07:24)  HR: 74 (10-16-18 @ 07:24) (60 - 89)  BP: 101/63 (10-16-18 @ 09:35) (97/68 - 136/68)  RR: 18 (10-16-18 @ 07:24) (12 - 18)  SpO2: 97% (10-16-18 @ 07:24) (86% - 100%)  Wt(kg): --    No Nausea/vomiting, recall, sore throat or headache.    No anesthesia related complaints or sequelae.

## 2018-10-16 NOTE — DISCHARGE NOTE ADULT - MEDICATION SUMMARY - MEDICATIONS TO STOP TAKING
I will STOP taking the medications listed below when I get home from the hospital:    Motrin 800 mg oral tablet  -- as needed

## 2018-10-16 NOTE — DISCHARGE NOTE ADULT - PATIENT PORTAL LINK FT
You can access the [facility] Patient Portal, offered by [facility], by registering with the following website: [email or web address]

## 2018-10-16 NOTE — PROGRESS NOTE ADULT - SUBJECTIVE AND OBJECTIVE BOX
VANESSA CORADO 73y Female  MRN-[id number]     ORTHOPEDIC SURGERY / DR. ROMERO    POD # 1    Vital Signs Last 24 Hrs  T(C): 36.8 (16 Oct 2018 04:59), Max: 36.9 (15 Oct 2018 23:14)  T(F): 98.3 (16 Oct 2018 04:59), Max: 98.4 (15 Oct 2018 23:14)  HR: 60 (16 Oct 2018 04:59) (60 - 89)  BP: 97/68 (16 Oct 2018 04:59) (97/68 - 129/60)  BP(mean): --  RR: 16 (16 Oct 2018 04:59) (12 - 17)  SpO2: 100% (16 Oct 2018 04:59) (86% - 100%)    RIGHT KNEE :    DRESSING DRY AND INTACT  GOOD MOTOR TO RIGHT LOWER EXTREMITY  NEURO-VASCULAR STATUS INTACT  NO CALF TENDERNESS    Hemoglobin: 12.1 (10-16 @ 05:13)  Hemoglobin: 12.1 (10-15 @ 18:07)    Hematocrit: 35.7 (10-16 @ 05:13)  Hematocrit: 36.8 (10-15 @ 18:07)    10-16    138  |  104  |  12  ----------------------------<  142<H>  4.7   |  27  |  0.67    Ca    8.5      16 Oct 2018 05:13    ASSESSMENT &  PLAN:  POD # 1 S/P  RIGHT TOTAL KNEE  REPLACEMENT    WEIGHT  BEARING AS TOLERATED, OOB AND AMBULATE, PHYSICAL THERAPY   DVT PROPHYLAXIS  MG PO BID  INCENTIVE SPIROMETRY   DISCHARGE PLANNING TO HOME WITH HOME CARE

## 2018-10-16 NOTE — OCCUPATIONAL THERAPY INITIAL EVALUATION ADULT - ADDITIONAL COMMENTS
Pt lives in a house with no steps to enter. Bedroom and bathroom on main floor. Pt reports that her dtr lives upstairs. Pt has a bathtub with curtain. Pt does not own any DME. Pt reports that her family will be available to assist her upon d/c home. Pt declined to order a commode due to limited space on either side of toilet and will get a raised toilet seat on her own.

## 2018-10-16 NOTE — DISCHARGE NOTE ADULT - CARE PROVIDER_API CALL
Hero Redding), Orthopaedic Surgery  [address] [place] [address]  Phone: [phone]  Fax: [phone]

## 2018-10-16 NOTE — DISCHARGE NOTE ADULT - MEDICATION SUMMARY - MEDICATIONS TO TAKE
I will START or STAY ON the medications listed below when I get home from the hospital:    acetaminophen 325 mg oral tablet  -- 2 tab(s) by mouth every 6 hours, As needed, Mild Pain (1 - 3)  -- Indication: For PAIN, HOME MEDICATION     celecoxib 200 mg oral capsule  -- 1 cap(s) by mouth 2 times a day  -- Indication: For PAIN     HYDROmorphone 2 mg oral tablet  -- 1 tab(s) by mouth every 6 hours, As Needed -for severe pain MDD:4   -- Indication: For PAIN     aspirin 325 mg oral delayed release tablet  -- 1 tab(s) by mouth 2 times a day   -- Indication: For DVT PROPHYLAXIS, PREVENT BLOOD CLOTS IN LEGS     traZODone 100 mg oral tablet  -- orally once a day (at bedtime)  -- Indication: For HOME MEDICATION     atorvastatin 10 mg oral tablet  -- 1 tab(s) by mouth once a day (at bedtime)  -- Indication: For HOME MEDICATION     ALPRAZolam 0.5 mg oral tablet  -- 1 tab(s) by mouth once a day, As needed, anxiety  -- Indication: For HOME MEDICATION     amLODIPine 5 mg oral tablet  -- 1 tab(s) by mouth once a day  -- Indication: For HOME MEDICATION     Probiotic Formula oral capsule  -- 1 cap(s) by mouth once a day  -- Indication: For HOME MEDICATION     pantoprazole 40 mg oral delayed release tablet  -- 1 tab(s) by mouth 4x/week  -- Indication: For HOME MEDICATION     Tylenol Allergy Sinus Caplet  -- 1 tab(s)  every 4 hours, As Needed  -- Indication: For HOME MEDICATION     multivitamin  -- 1 tab(s) by mouth once a day  last dose to be 11/14/2016  -- Indication: For HOME MEDICATION

## 2018-10-16 NOTE — OCCUPATIONAL THERAPY INITIAL EVALUATION ADULT - ORIENTATION, REHAB EVAL
oriented to person, place, time and situation/Pt instructed in weight bearing status, DME needs, d/c plan and role of OT. Pt provided with knee replacement education pamphlet for follow up care. Pt educated regarding fall prevention in hospital and recommendation to use call bell/ask for assistance with all ADL's/transfers etc.

## 2018-10-16 NOTE — DISCHARGE NOTE ADULT - PLAN OF CARE
RECOVER FROM SURGERY, PHYSICAL THERAPY WEIGHT BEARING AS TOLERATED.  FOLLOW UP WITH DR. ROMERO NEXT THURSDAY 10/25/2018 CALL [phone] FOR AN APPOINTMENT.  KEEP KNEE AQUACEL  DRESSING  ON DRY AND CLEAN, IT WILL BE CHANGED OR REMOVED ON YOUR NEXT OFFICE VISIT .

## 2018-10-16 NOTE — DISCHARGE NOTE ADULT - HOSPITAL COURSE
THIS IS A CASE OF A 73 O MALE EVALUATED IN THE OFFICE DUE TO RIGHT KNEE PAIN.    PAST MEDICAL HISTORY: ANXIETY, GERD, HERNANDEZ'S ESOPHAGUS, HIATAL HERNIA, HYPERLIPIDEMIA, HTN, IBS, POST NASAL DRIP     HOSPITAL COURSE: AFTER THE RISK AND BENEFITS OF SURGICAL INTERVENTION IN DETAILS WERE DISCUSSED WITH THE PATIENT, A CONSENT WAS OBTAINED. AFTER OBTAINING MEDICAL CLEARANCE AND PREOPERATIVE EVALUATION, THE PATIENT WAS TAKEN TO THE OPERATING ROOM ON 10/15/2018 AND THE PATIENT UNDERWENT A  RIGHT TOTAL KNEE REPLACEMENT. POSTOPERATIVE PHASE, THE PATIENT WAS ANTICOAGULATED WITH ASPIRIN AND WAS GIVEN 24 HOURS OF IV ANTIBIOTICS. A SOCIAL SERVICE CONSULT WAS OBTAINED FOR DISCHARGE PLANNING. A PHYSICAL THERAPY CONSULT WAS OBTAINED FOR  WEIGHT BEARING AS TOLERATED.   DUE TO ANEMIA OF ACUTE BLOOD LOSS POST OP  IRON SUPPLEMENT GIVEN.     DISPOSITION : HOME,  FOLLOW UP WITH DR. ROMERO AS OUTPATIENT.

## 2018-10-16 NOTE — DISCHARGE NOTE ADULT - CARE PLAN
Principal Discharge DX:	Osteoarthritis of right knee, unspecified osteoarthritis type  Goal:	RECOVER FROM SURGERY, PHYSICAL THERAPY  Assessment and plan of treatment:	WEIGHT BEARING AS TOLERATED.  FOLLOW UP WITH DR. ROMERO NEXT THURSDAY 10/25/2018 CALL [phone] FOR AN APPOINTMENT.  KEEP KNEE AQUACEL  DRESSING  ON DRY AND CLEAN, IT WILL BE CHANGED OR REMOVED ON YOUR NEXT OFFICE VISIT .

## 2018-10-16 NOTE — PROGRESS NOTE ADULT - SUBJECTIVE AND OBJECTIVE BOX
Patient is a 73y old  Female who presents with a chief complaint of RIGHT KNEE PAIN  RIGHT KNEE END STAGE OSTEOARTHRITIS  RIGHT TOTAL KNEE REPLACEMENT (16 Oct 2018 05:58)  feels well    INTERVAL HPI/OVERNIGHT EVENTS:  T(C): 37.1 (10-16-18 @ 07:24), Max: 37.1 (10-16-18 @ 07:24)  HR: 74 (10-16-18 @ 07:24) (60 - 89)  BP: 101/63 (10-16-18 @ 09:35) (97/68 - 136/68)  RR: 18 (10-16-18 @ 07:24) (12 - 18)  SpO2: 97% (10-16-18 @ 07:24) (86% - 100%)  Wt(kg): --  I&O's Summary    15 Oct 2018 07:01  -  16 Oct 2018 07:00  --------------------------------------------------------  IN: 645 mL / OUT: 1300 mL / NET: -655 mL        LABS:                        12.1   9.19  )-----------( 243      ( 16 Oct 2018 05:13 )             35.7     10-16    138  |  104  |  12  ----------------------------<  142<H>  4.7   |  27  |  0.67    Ca    8.5      16 Oct 2018 05:13          CAPILLARY BLOOD GLUCOSE      POCT Blood Glucose.: 110 mg/dL (15 Oct 2018 15:22)            MEDICATIONS  (STANDING):  acetaminophen   Tablet .. 650 milliGRAM(s) Oral every 8 hours  ALPRAZolam 0.5 milliGRAM(s) Oral at bedtime  amLODIPine   Tablet 5 milliGRAM(s) Oral daily  ascorbic acid 500 milliGRAM(s) Oral two times a day  aspirin enteric coated 325 milliGRAM(s) Oral two times a day  atorvastatin 10 milliGRAM(s) Oral at bedtime  docusate sodium 100 milliGRAM(s) Oral three times a day  ferrous    sulfate 325 milliGRAM(s) Oral three times a day with meals  folic acid 1 milliGRAM(s) Oral daily  multivitamin 1 Tablet(s) Oral daily  pantoprazole    Tablet 40 milliGRAM(s) Oral daily  sodium chloride 0.9%. 1000 milliLiter(s) (100 mL/Hr) IV Continuous <Continuous>  traZODone 100 milliGRAM(s) Oral at bedtime    MEDICATIONS  (PRN):  ALPRAZolam 0.25 milliGRAM(s) Oral once PRN anxiety  HYDROmorphone   Tablet 2 milliGRAM(s) Oral every 4 hours PRN Moderate Pain (4 - 6)  HYDROmorphone   Tablet 4 milliGRAM(s) Oral every 4 hours PRN Severe Pain (7 - 10)  morphine  - Injectable 2 milliGRAM(s) IV Push every 4 hours PRN Severe Pain (7 - 10)  ondansetron Injectable 4 milliGRAM(s) IV Push every 6 hours PRN Nausea and/or Vomiting      REVIEW OF SYSTEMS:  CONSTITUTIONAL: No fever, weight loss, or fatigue  EYES: No eye pain, visual disturbances, or discharge  ENMT:  No difficulty hearing, tinnitus, vertigo; No sinus or throat pain  NECK: No pain or stiffness  RESPIRATORY: No cough, wheezing, chills or hemoptysis; No shortness of breath  CARDIOVASCULAR: No chest pain, palpitations, dizziness, or leg swelling  GASTROINTESTINAL: No abdominal or epigastric pain. No nausea, vomiting, or hematemesis; No diarrhea or constipation. No melena or hematochezia.  GENITOURINARY: No dysuria, frequency, hematuria, or incontinence  NEUROLOGICAL: No headaches, memory loss, loss of strength, numbness, or tremors  SKIN: No itching, burning, rashes, or lesions   LYMPH NODES: No enlarged glands  ENDOCRINE: No heat or cold intolerance; No hair loss  MUSCULOSKELETAL: No joint pain or swelling; No muscle, back, or extremity pain  PSYCHIATRIC: No depression, anxiety, mood swings, or difficulty sleeping  HEME/LYMPH: No easy bruising, or bleeding gums  ALLERY AND IMMUNOLOGIC: No hives or eczema    RADIOLOGY & ADDITIONAL TESTS:    Imaging Personally Reviewed:  [ ] YES  [ ] NO    Consultant(s) Notes Reviewed:  [ ] YES  [ ] NO    PHYSICAL EXAM:  GENERAL: NAD, well-groomed, well-developed  HEAD:  Atraumatic, Normocephalic  EYES: EOMI, PERRLA, conjunctiva and sclera clear  ENMT: No tonsillar erythema, exudates, or enlargement; Moist mucous membranes, Good dentition, No lesions  NECK: Supple, No JVD, Normal thyroid  NERVOUS SYSTEM:  Alert & Oriented X3, Good concentration; Motor Strength 5/5 B/L upper and lower extremities; DTRs 2+ intact and symmetric  CHEST/LUNG: Clear to percussion bilaterally; No rales, rhonchi, wheezing, or rubs  HEART: Regular rate and rhythm; No murmurs, rubs, or gallops  ABDOMEN: Soft, Nontender, Nondistended; Bowel sounds present  EXTREMITIES:  2+ Peripheral Pulses, No clubbing, cyanosis, or edema  LYMPH: No lymphadenopathy noted  SKIN: No rashes or lesions    Care Discussed with Consultants/Other Providers [ ] YES  [ ] NO

## 2018-10-17 DIAGNOSIS — Z71.89 OTHER SPECIFIED COUNSELING: ICD-10-CM

## 2018-10-17 LAB
HCT VFR BLD CALC: 34.4 % — LOW (ref 34.5–45)
HGB BLD-MCNC: 11.4 G/DL — LOW (ref 11.5–15.5)
MCHC RBC-ENTMCNC: 30.7 PG — SIGNIFICANT CHANGE UP (ref 27–34)
MCHC RBC-ENTMCNC: 33.1 GM/DL — SIGNIFICANT CHANGE UP (ref 32–36)
MCV RBC AUTO: 92.7 FL — SIGNIFICANT CHANGE UP (ref 80–100)
NRBC # BLD: 0 /100 WBCS — SIGNIFICANT CHANGE UP (ref 0–0)
PLATELET # BLD AUTO: 241 K/UL — SIGNIFICANT CHANGE UP (ref 150–400)
RBC # BLD: 3.71 M/UL — LOW (ref 3.8–5.2)
RBC # FLD: 12.2 % — SIGNIFICANT CHANGE UP (ref 10.3–14.5)
SURGICAL PATHOLOGY FINAL REPORT - CH: SIGNIFICANT CHANGE UP
WBC # BLD: 11.67 K/UL — HIGH (ref 3.8–10.5)
WBC # FLD AUTO: 11.67 K/UL — HIGH (ref 3.8–10.5)

## 2018-10-17 RX ORDER — ASPIRIN/CALCIUM CARB/MAGNESIUM 324 MG
1 TABLET ORAL
Qty: 60 | Refills: 0
Start: 2018-10-17 | End: 2018-11-15

## 2018-10-17 RX ORDER — HYDROMORPHONE HYDROCHLORIDE 2 MG/ML
1 INJECTION INTRAMUSCULAR; INTRAVENOUS; SUBCUTANEOUS
Qty: 20 | Refills: 0
Start: 2018-10-17 | End: 2018-10-21

## 2018-10-17 RX ORDER — CELECOXIB 200 MG/1
1 CAPSULE ORAL
Qty: 0 | Refills: 0 | DISCHARGE
Start: 2018-10-17

## 2018-10-17 RX ORDER — IBUPROFEN 200 MG
0 TABLET ORAL
Qty: 0 | Refills: 0 | COMMUNITY

## 2018-10-17 RX ADMIN — Medication 325 MILLIGRAM(S): at 17:22

## 2018-10-17 RX ADMIN — CELECOXIB 200 MILLIGRAM(S): 200 CAPSULE ORAL at 17:59

## 2018-10-17 RX ADMIN — Medication 500 MILLIGRAM(S): at 17:23

## 2018-10-17 RX ADMIN — Medication 325 MILLIGRAM(S): at 17:23

## 2018-10-17 RX ADMIN — HYDROMORPHONE HYDROCHLORIDE 4 MILLIGRAM(S): 2 INJECTION INTRAMUSCULAR; INTRAVENOUS; SUBCUTANEOUS at 08:20

## 2018-10-17 RX ADMIN — Medication 650 MILLIGRAM(S): at 15:00

## 2018-10-17 RX ADMIN — Medication 100 MILLIGRAM(S): at 06:04

## 2018-10-17 RX ADMIN — Medication 325 MILLIGRAM(S): at 11:17

## 2018-10-17 RX ADMIN — ATORVASTATIN CALCIUM 10 MILLIGRAM(S): 80 TABLET, FILM COATED ORAL at 21:36

## 2018-10-17 RX ADMIN — Medication 650 MILLIGRAM(S): at 14:28

## 2018-10-17 RX ADMIN — CELECOXIB 200 MILLIGRAM(S): 200 CAPSULE ORAL at 06:31

## 2018-10-17 RX ADMIN — Medication 650 MILLIGRAM(S): at 22:00

## 2018-10-17 RX ADMIN — ONDANSETRON 4 MILLIGRAM(S): 8 TABLET, FILM COATED ORAL at 08:20

## 2018-10-17 RX ADMIN — PANTOPRAZOLE SODIUM 40 MILLIGRAM(S): 20 TABLET, DELAYED RELEASE ORAL at 11:17

## 2018-10-17 RX ADMIN — HYDROMORPHONE HYDROCHLORIDE 4 MILLIGRAM(S): 2 INJECTION INTRAMUSCULAR; INTRAVENOUS; SUBCUTANEOUS at 08:50

## 2018-10-17 RX ADMIN — Medication 325 MILLIGRAM(S): at 06:04

## 2018-10-17 RX ADMIN — Medication 100 MILLIGRAM(S): at 21:36

## 2018-10-17 RX ADMIN — HYDROMORPHONE HYDROCHLORIDE 4 MILLIGRAM(S): 2 INJECTION INTRAMUSCULAR; INTRAVENOUS; SUBCUTANEOUS at 19:43

## 2018-10-17 RX ADMIN — Medication 650 MILLIGRAM(S): at 06:30

## 2018-10-17 RX ADMIN — Medication 100 MILLIGRAM(S): at 14:28

## 2018-10-17 RX ADMIN — CELECOXIB 200 MILLIGRAM(S): 200 CAPSULE ORAL at 06:04

## 2018-10-17 RX ADMIN — AMLODIPINE BESYLATE 5 MILLIGRAM(S): 2.5 TABLET ORAL at 21:36

## 2018-10-17 RX ADMIN — Medication 1 MILLIGRAM(S): at 11:17

## 2018-10-17 RX ADMIN — ONDANSETRON 4 MILLIGRAM(S): 8 TABLET, FILM COATED ORAL at 19:46

## 2018-10-17 RX ADMIN — Medication 650 MILLIGRAM(S): at 06:04

## 2018-10-17 RX ADMIN — HYDROMORPHONE HYDROCHLORIDE 4 MILLIGRAM(S): 2 INJECTION INTRAMUSCULAR; INTRAVENOUS; SUBCUTANEOUS at 20:01

## 2018-10-17 RX ADMIN — Medication 500 MILLIGRAM(S): at 06:04

## 2018-10-17 RX ADMIN — Medication 325 MILLIGRAM(S): at 08:04

## 2018-10-17 RX ADMIN — CELECOXIB 200 MILLIGRAM(S): 200 CAPSULE ORAL at 17:23

## 2018-10-17 RX ADMIN — Medication 0.5 MILLIGRAM(S): at 21:36

## 2018-10-17 RX ADMIN — Medication 650 MILLIGRAM(S): at 21:36

## 2018-10-17 RX ADMIN — Medication 1 TABLET(S): at 11:17

## 2018-10-17 NOTE — PROGRESS NOTE ADULT - SUBJECTIVE AND OBJECTIVE BOX
VANESSA CORADO 73y Female  MRN-[id number]     ORTHOPEDIC SURGERY / DR. ROMERO    POD # 2    Vital Signs Last 24 Hrs  T(C): 37 (17 Oct 2018 04:03), Max: 37.1 (16 Oct 2018 07:24)  T(F): 98.6 (17 Oct 2018 04:03), Max: 98.7 (16 Oct 2018 07:24)  HR: 93 (17 Oct 2018 04:03) (65 - 93)  BP: 127/67 (17 Oct 2018 04:03) (101/63 - 136/68)  BP(mean): --  RR: 18 (17 Oct 2018 04:03) (16 - 18)  SpO2: 91% (17 Oct 2018 04:03) (91% - 98%)    RIGHT KNEE :    DRESSING DRY AND INTACT  SOME EDEMA  GOOD MOTOR TO RIGHT LOWER EXTREMITY  NEURO-VASCULAR STATUS INTACT  NO CALF TENDERNESS    Hemoglobin: 12.1 (10-16 @ 05:13)  Hemoglobin: 12.1 (10-15 @ 18:07)    Hematocrit: 35.7 (10-16 @ 05:13)  Hematocrit: 36.8 (10-15 @ 18:07)    10-16    138  |  104  |  12  ----------------------------<  142<H>  4.7   |  27  |  0.67    Ca    8.5      16 Oct 2018 05:13    ASSESSMENT &  PLAN:  POD # 2 S/P  RIGHT TOTAL KNEE  REPLACEMENT    WEIGHT  BEARING AS TOLERATED, OOB AND AMBULATE, PHYSICAL THERAPY   DVT PROPHYLAXIS  MG PO BID  INCENTIVE SPIROMETRY   DISCHARGE PLANNING TO HOME WITH HOME CARE

## 2018-10-17 NOTE — PROGRESS NOTE ADULT - SUBJECTIVE AND OBJECTIVE BOX
Patient is a 73y old  Female who presents with a chief complaint of RIGHT KNEE PAIN  RIGHT KNEE END STAGE OSTEOARTHRITIS  RIGHT TOTAL KNEE REPLACEMENT (16 Oct 2018 05:58)  feels better today.  less discomfort    INTERVAL HPI/OVERNIGHT EVENTS:  T(C): 37.2 (10-17-18 @ 12:39), Max: 37.2 (10-17-18 @ 12:39)  HR: 79 (10-17-18 @ 12:39) (65 - 93)  BP: 114/68 (10-17-18 @ 12:39) (114/68 - 129/67)  RR: 18 (10-17-18 @ 12:39) (16 - 18)  SpO2: 95% (10-17-18 @ 12:39) (91% - 98%)  Wt(kg): --  I&O's Summary    16 Oct 2018 07:01  -  17 Oct 2018 07:00  --------------------------------------------------------  IN: 3300 mL / OUT: 1975 mL / NET: 1325 mL        LABS:                        11.4   11.67 )-----------( 241      ( 17 Oct 2018 06:22 )             34.4     10-16    138  |  104  |  12  ----------------------------<  142<H>  4.7   |  27  |  0.67    Ca    8.5      16 Oct 2018 05:13          MEDICATIONS  (STANDING):  acetaminophen   Tablet .. 650 milliGRAM(s) Oral every 8 hours  ALPRAZolam 0.5 milliGRAM(s) Oral at bedtime  amLODIPine   Tablet 5 milliGRAM(s) Oral daily  ascorbic acid 500 milliGRAM(s) Oral two times a day  aspirin enteric coated 325 milliGRAM(s) Oral two times a day  atorvastatin 10 milliGRAM(s) Oral at bedtime  celecoxib 200 milliGRAM(s) Oral two times a day  docusate sodium 100 milliGRAM(s) Oral three times a day  ferrous    sulfate 325 milliGRAM(s) Oral three times a day with meals  folic acid 1 milliGRAM(s) Oral daily  multivitamin 1 Tablet(s) Oral daily  pantoprazole    Tablet 40 milliGRAM(s) Oral daily  traZODone 100 milliGRAM(s) Oral at bedtime    MEDICATIONS  (PRN):  ALPRAZolam 0.25 milliGRAM(s) Oral once PRN anxiety  HYDROmorphone   Tablet 2 milliGRAM(s) Oral every 4 hours PRN Moderate Pain (4 - 6)  HYDROmorphone   Tablet 4 milliGRAM(s) Oral every 4 hours PRN Severe Pain (7 - 10)  morphine  - Injectable 2 milliGRAM(s) IV Push every 4 hours PRN Severe Pain (7 - 10)  ondansetron Injectable 4 milliGRAM(s) IV Push every 6 hours PRN Nausea and/or Vomiting      REVIEW OF SYSTEMS:  CONSTITUTIONAL: No fever, weight loss, or fatigue  EYES: No eye pain, visual disturbances, or discharge  ENMT:  No difficulty hearing, tinnitus, vertigo; No sinus or throat pain  NECK: No pain or stiffness  RESPIRATORY: No cough, wheezing, chills or hemoptysis; No shortness of breath  CARDIOVASCULAR: No chest pain, palpitations, dizziness, or leg swelling  GASTROINTESTINAL: No abdominal or epigastric pain. No nausea, vomiting, or hematemesis; No diarrhea or constipation. No melena or hematochezia.  GENITOURINARY: No dysuria, frequency, hematuria, or incontinence  NEUROLOGICAL: No headaches, memory loss, loss of strength, numbness, or tremors  SKIN: No itching, burning, rashes, or lesions   LYMPH NODES: No enlarged glands  ENDOCRINE: No heat or cold intolerance; No hair loss  MUSCULOSKELETAL: chronic knee pain  PSYCHIATRIC: No depression, anxiety, mood swings, or difficulty sleeping  HEME/LYMPH: No easy bruising, or bleeding gums  ALLERY AND IMMUNOLOGIC: No hives or eczema    RADIOLOGY & ADDITIONAL TESTS:    Imaging Personally Reviewed:  [ ] YES  [ ] NO    Consultant(s) Notes Reviewed:  [ ] YES  [ ] NO    PHYSICAL EXAM:  GENERAL: NAD, well-groomed, well-developed  HEAD:  Atraumatic, Normocephalic  EYES: EOMI, PERRLA, conjunctiva and sclera clear  ENMT: No tonsillar erythema, exudates, or enlargement; Moist mucous membranes, Good dentition, No lesions  NECK: Supple, No JVD, Normal thyroid  NERVOUS SYSTEM:  Alert & Oriented X3, Good concentration; Motor Strength 5/5 B/L upper and lower extremities; DTRs 2+ intact and symmetric  CHEST/LUNG: Clear to percussion bilaterally; No rales, rhonchi, wheezing, or rubs  HEART: Regular rate and rhythm; No murmurs, rubs, or gallops  ABDOMEN: Soft, Nontender, Nondistended; Bowel sounds present  EXTREMITIES:  2+ Peripheral Pulses, No clubbing, cyanosis, or edema  LYMPH: No lymphadenopathy noted  SKIN: No rashes or lesions    Care Discussed with Consultants/Other Providers [ ] YES  [ ] NO

## 2018-10-17 NOTE — PROGRESS NOTE ADULT - PROBLEM SELECTOR PLAN 5
advance care planning discussed at length  pt advised to provide health care proxy document w appointed health care agent

## 2018-10-18 VITALS
HEART RATE: 92 BPM | TEMPERATURE: 98 F | DIASTOLIC BLOOD PRESSURE: 71 MMHG | RESPIRATION RATE: 16 BRPM | OXYGEN SATURATION: 92 % | SYSTOLIC BLOOD PRESSURE: 118 MMHG

## 2018-10-18 RX ADMIN — Medication 500 MILLIGRAM(S): at 05:45

## 2018-10-18 RX ADMIN — Medication 1 MILLIGRAM(S): at 12:14

## 2018-10-18 RX ADMIN — Medication 650 MILLIGRAM(S): at 05:45

## 2018-10-18 RX ADMIN — Medication 100 MILLIGRAM(S): at 05:45

## 2018-10-18 RX ADMIN — Medication 325 MILLIGRAM(S): at 12:15

## 2018-10-18 RX ADMIN — Medication 1 TABLET(S): at 12:15

## 2018-10-18 RX ADMIN — CELECOXIB 200 MILLIGRAM(S): 200 CAPSULE ORAL at 06:15

## 2018-10-18 RX ADMIN — CELECOXIB 200 MILLIGRAM(S): 200 CAPSULE ORAL at 05:45

## 2018-10-18 RX ADMIN — PANTOPRAZOLE SODIUM 40 MILLIGRAM(S): 20 TABLET, DELAYED RELEASE ORAL at 12:14

## 2018-10-18 RX ADMIN — Medication 650 MILLIGRAM(S): at 06:15

## 2018-10-18 RX ADMIN — Medication 325 MILLIGRAM(S): at 05:45

## 2018-10-18 RX ADMIN — Medication 325 MILLIGRAM(S): at 08:21

## 2018-10-18 NOTE — PROGRESS NOTE ADULT - SUBJECTIVE AND OBJECTIVE BOX
VANESSA CORADO 73y Female  MRN-[id number]     ORTHOPEDIC SURGERY / DR. ROMERO    POD # 3    Vital Signs Last 24 Hrs  T(C): 36.9 (17 Oct 2018 23:42), Max: 37.2 (17 Oct 2018 12:39)  T(F): 98.4 (17 Oct 2018 23:42), Max: 98.9 (17 Oct 2018 12:39)  HR: 83 (17 Oct 2018 23:42) (79 - 87)  BP: 111/63 (17 Oct 2018 23:42) (111/63 - 154/67)  BP(mean): --  RR: 16 (17 Oct 2018 23:42) (16 - 18)  SpO2: 94% (17 Oct 2018 23:42) (94% - 96%)    RIGHT KNEE :    WOUND DRY AND INTACT  SOME EDEMA  GOOD MOTOR TO  RIGHT LOWER EXTREMITY  NEURO-VASCULAR STATUS INTACT  NO CALF TENDERNESS    Hemoglobin: 11.4 (10-17 @ 06:22)  Hemoglobin: 12.1 (10-16 @ 05:13)  Hemoglobin: 12.1 (10-15 @ 18:07)    Hematocrit: 34.4 (10-17 @ 06:22)  Hematocrit: 35.7 (10-16 @ 05:13)  Hematocrit: 36.8 (10-15 @ 18:07)    ASSESSMENT &  PLAN:  POD # 3 S/P  RIGHT TOTAL KNEE  REPLACEMENT    WEIGHT  BEARING AS TOLERATED, OOB AND AMBULATE, PHYSICAL THERAPY   DVT PROPHYLAXIS  MG PO BID  INCENTIVE SPIROMETRY   DISCHARGE PLANNING TO HOME WITH HOME CARE TODAY   NEW AQUACEL DRESSING APPLIED

## 2018-11-11 PROCEDURE — C1887: CPT

## 2018-11-11 PROCEDURE — 97535 SELF CARE MNGMENT TRAINING: CPT

## 2018-11-11 PROCEDURE — 80048 BASIC METABOLIC PNL TOTAL CA: CPT

## 2018-11-11 PROCEDURE — C1776: CPT

## 2018-11-11 PROCEDURE — 85027 COMPLETE CBC AUTOMATED: CPT

## 2018-11-11 PROCEDURE — 97116 GAIT TRAINING THERAPY: CPT

## 2018-11-11 PROCEDURE — 36415 COLL VENOUS BLD VENIPUNCTURE: CPT

## 2018-11-11 PROCEDURE — 97165 OT EVAL LOW COMPLEX 30 MIN: CPT

## 2018-11-11 PROCEDURE — 88311 DECALCIFY TISSUE: CPT

## 2018-11-11 PROCEDURE — 97530 THERAPEUTIC ACTIVITIES: CPT

## 2018-11-11 PROCEDURE — 82962 GLUCOSE BLOOD TEST: CPT

## 2018-11-11 PROCEDURE — 97161 PT EVAL LOW COMPLEX 20 MIN: CPT

## 2018-11-11 PROCEDURE — C1889: CPT

## 2018-11-11 PROCEDURE — 73562 X-RAY EXAM OF KNEE 3: CPT

## 2018-11-11 PROCEDURE — C1713: CPT

## 2018-11-11 PROCEDURE — 88305 TISSUE EXAM BY PATHOLOGIST: CPT

## 2018-11-11 PROCEDURE — 97112 NEUROMUSCULAR REEDUCATION: CPT

## 2018-11-27 ENCOUNTER — APPOINTMENT (OUTPATIENT)
Dept: THORACIC SURGERY | Facility: CLINIC | Age: 73
End: 2018-11-27
Payer: MEDICARE

## 2018-11-27 VITALS
SYSTOLIC BLOOD PRESSURE: 141 MMHG | OXYGEN SATURATION: 98 % | WEIGHT: 149 LBS | RESPIRATION RATE: 18 BRPM | BODY MASS INDEX: 26.4 KG/M2 | TEMPERATURE: 98.2 F | HEIGHT: 63 IN | DIASTOLIC BLOOD PRESSURE: 84 MMHG | HEART RATE: 81 BPM

## 2018-11-27 DIAGNOSIS — Z96.651 PRESENCE OF RIGHT ARTIFICIAL KNEE JOINT: ICD-10-CM

## 2018-11-27 PROCEDURE — 99213 OFFICE O/P EST LOW 20 MIN: CPT

## 2019-05-28 ENCOUNTER — APPOINTMENT (OUTPATIENT)
Dept: THORACIC SURGERY | Facility: CLINIC | Age: 74
End: 2019-05-28
Payer: MEDICARE

## 2019-05-28 PROCEDURE — 99214 OFFICE O/P EST MOD 30 MIN: CPT

## 2019-05-29 VITALS
HEART RATE: 72 BPM | HEIGHT: 63 IN | SYSTOLIC BLOOD PRESSURE: 151 MMHG | TEMPERATURE: 97.4 F | BODY MASS INDEX: 26.4 KG/M2 | DIASTOLIC BLOOD PRESSURE: 84 MMHG | OXYGEN SATURATION: 96 % | RESPIRATION RATE: 16 BRPM | WEIGHT: 149 LBS

## 2019-05-31 NOTE — HISTORY OF PRESENT ILLNESS
[FreeTextEntry1] : Kym Jung is a 73 year old female who presents for follow up S/P uniportal left VATS, left lower lobectomy on 11/21/16 for a [id number] adenocarcinoma, papillary predominant. \par \par Chest CT done 5/22/19 revealed:\par - s/p LLLobectomy\par - mild centrilobular pulmonary edema\par - no focal pulmonary masses or nodules\par - stable subcentimeter hypodense nodule right thyroid node\par - stable small 1 cm nodule of left adrenal gland\par \par Chest CT done 11/20/18 revealed:\par - volume loss within the left hemithorax consistent with prior left lung resection\par - minor fibrotic/discoid atelectatic changes are the left base are again noted\par - emphysematous changes are again seen\par - a prominent lymph node within the prevascular space of the mediastinum is unchanged in size and appearance\par - less prominent mediastinal lymph nodes described are also unchanged\par - the hilar regions are grossly unchanged on the basis of this noncontrast study\par - small hiatal hernia is again noted\par  \par She returns today and reports that she feels well, but admits to abdominal pain related to IBS and right knee pain.  She denies any fever, chills, cough, shortness of breath, chest pain, hemoptysis, or recent illness.

## 2019-05-31 NOTE — PHYSICAL EXAM
[Sclera] : the sclera and conjunctiva were normal [Neck Appearance] : the appearance of the neck was normal [] : the neck was supple [Respiration, Rhythm And Depth] : normal respiratory rhythm and effort [Neck Cervical Mass (___cm)] : no neck mass was observed [Auscultation Breath Sounds / Voice Sounds] : lungs were clear to auscultation bilaterally [Heart Rate And Rhythm] : heart rate was normal and rhythm regular [Examination Of The Chest] : the chest was normal in appearance [Heart Sounds] : normal S1 and S2 [Abdomen Tenderness] : non-tender [Cervical Lymph Nodes Enlarged Posterior Bilaterally] : posterior cervical [Abdomen Soft] : soft [Cervical Lymph Nodes Enlarged Anterior Bilaterally] : anterior cervical [Supraclavicular Lymph Nodes Enlarged Bilaterally] : supraclavicular [No CVA Tenderness] : no ~M costovertebral angle tenderness [Abnormal Walk] : normal gait [No Focal Deficits] : no focal deficits [Skin Color & Pigmentation] : normal skin color and pigmentation [Oriented To Time, Place, And Person] : oriented to person, place, and time [Impaired Insight] : insight and judgment were intact [Affect] : the affect was normal [FreeTextEntry1] : ,

## 2019-05-31 NOTE — REVIEW OF SYSTEMS
[Abdominal Pain] : abdominal pain [Diarrhea] : diarrhea [Constipation] : constipation [Joint Pain] : joint pain [Negative] : Heme/Lymph [FreeTextEntry7] : hx of IBS [FreeTextEntry9] : Right knee pain, does not require pain medication

## 2019-05-31 NOTE — ASSESSMENT
[FreeTextEntry1] : Kym Jung is a 73 year old female who presents for follow up S/P uniportal left VATS, left lower lobectomy on 11/21/16 for a [id number] adenocarcinoma, papillary predominant. \par \par Chest CT done 5/22/19 revealed:\par - s/p LLLobectomy\par - mild centrilobular pulmonary edema\par - no focal pulmonary masses or nodules\par - stable subcentimeter hypodense nodule right thyroid node\par - stable small 1 cm nodule of left adrenal gland\par \par Chest CT done 11/20/18 revealed:\par - volume loss within the left hemithorax consistent with prior left lung resection\par - minor fibrotic/discoid atelectatic changes are the left base are again noted\par - emphysematous changes are again seen\par - a prominent lymph node within the prevascular space of the mediastinum is unchanged in size and appearance\par - less prominent mediastinal lymph nodes described are also unchanged\par - the hilar regions are grossly unchanged on the basis of this noncontrast study\par - small hiatal hernia is again noted\par \par I have reviewed the patient's medical records and diagnostic images during the time of this office visit, and I have made the following recommendation: \par 1.  follow up in 6 months with CXR.\par \par Written by Gloria Chinchilla NP, acting as a scribe for MARTIN TOVAR MD.\par \par The documentation recorded by the scribe accurately reflects the service I personally performed and the decisions made by me. MARTIN TOVAR MD\par

## 2019-12-03 ENCOUNTER — APPOINTMENT (OUTPATIENT)
Dept: THORACIC SURGERY | Facility: CLINIC | Age: 74
End: 2019-12-03
Payer: MEDICARE

## 2019-12-03 VITALS
TEMPERATURE: 98 F | BODY MASS INDEX: 27.1 KG/M2 | HEART RATE: 73 BPM | SYSTOLIC BLOOD PRESSURE: 143 MMHG | OXYGEN SATURATION: 96 % | DIASTOLIC BLOOD PRESSURE: 84 MMHG | WEIGHT: 153 LBS | RESPIRATION RATE: 16 BRPM

## 2019-12-03 PROCEDURE — 99214 OFFICE O/P EST MOD 30 MIN: CPT

## 2019-12-03 RX ORDER — ASPIRIN 81 MG
81 TABLET, DELAYED RELEASE (ENTERIC COATED) ORAL
Refills: 0 | Status: DISCONTINUED | COMMUNITY
End: 2019-12-03

## 2019-12-03 NOTE — PHYSICAL EXAM
[Sclera] : the sclera and conjunctiva were normal [Extraocular Movements] : extraocular movements were intact [Neck Appearance] : the appearance of the neck was normal [Neck Cervical Mass (___cm)] : no neck mass was observed [Respiration, Rhythm And Depth] : normal respiratory rhythm and effort [] : no respiratory distress [Jugular Venous Distention Increased] : there was no jugular-venous distention [Auscultation Breath Sounds / Voice Sounds] : lungs were clear to auscultation bilaterally [Exaggerated Use Of Accessory Muscles For Inspiration] : no accessory muscle use [Diminished Respiratory Excursion] : normal chest expansion [Heart Rate And Rhythm] : heart rate was normal and rhythm regular [Abdomen Soft] : soft [Bowel Sounds] : normal bowel sounds [Abdomen Tenderness] : non-tender [Cervical Lymph Nodes Enlarged Posterior Bilaterally] : posterior cervical [Cervical Lymph Nodes Enlarged Anterior Bilaterally] : anterior cervical [Supraclavicular Lymph Nodes Enlarged Bilaterally] : supraclavicular [Abnormal Walk] : normal gait [Oriented To Time, Place, And Person] : oriented to person, place, and time [No Focal Deficits] : no focal deficits [Skin Color & Pigmentation] : normal skin color and pigmentation [Impaired Insight] : insight and judgment were intact [Affect] : the affect was normal [Mood] : the mood was normal

## 2019-12-06 NOTE — HISTORY OF PRESENT ILLNESS
[FreeTextEntry1] : Ms. Jung is a 74 year old female who presents today for follow up.  She is s/p uniportal Left VATS, LLLobectomy on 11/21/16, pathology revealed T1aN0 adenocarcinoma, papillary predominant. \par \par At previous visit in May 2019, CT Chest reviewed which revealed mild centrilobular pulmonary edema, no focal pulmonary masses or nodules, stable subcentimeter hypodense nodule right thyroid node, stable small 1 cm nodule of left adrenal gland.  \par \par CXR 11/15/19 reveals:\par - Mild diffuse emphysematous changes bilaterally\par - No active infiltrate or consolidation\par \par Chest CT done 5/22/19 revealed:\par - s/p LLLobectomy\par - mild centrilobular pulmonary edema\par - no focal pulmonary masses or nodules\par - stable subcentimeter hypodense nodule right thyroid node\par - stable small 1 cm nodule of left adrenal gland\par \par The patient reports occasional dry cough. The patient denies shortness of breath, fever, chills, dysphagia or hemoptysis. \par \par  \par

## 2019-12-06 NOTE — CONSULT LETTER
[Courtesy Letter:] : I had the pleasure of seeing your patient, [unfilled], in my office today. [Please see my note below.] : Please see my note below. [Sincerely,] : Sincerely, [FreeTextEntry3] : \par \par \par Lui Branch MD, FACS \par Chief, Division of Thoracic Surgery \par Director, Minimally Invasive Thoracic Surgery \par Department of Cardiovascular and Thoracic Surgery \par [facility] \par , Cardiovascular and Thoracic Surgery [FreeTextEntry2] : Dr. Montenegro (Pulm)\par Dr. PORSHA Pradhan (PCP) \par

## 2019-12-06 NOTE — ASSESSMENT
[FreeTextEntry1] : 74 year old female who presents today for follow up.  She is s/p uniportal Left VATS, LLLobectomy on 11/21/16, pathology revealed T1aN0 adenocarcinoma, papillary predominant. \par \par At previous visit in May 2019, CT Chest reviewed which revealed mild centrilobular pulmonary edema, no focal pulmonary masses or nodules, stable subcentimeter hypodense nodule right thyroid node, stable small 1 cm nodule of left adrenal gland.  \par \par CXR 11/15/19 reveals:\par - Mild diffuse emphysematous changes bilaterally\par - No active infiltrate or consolidation\par \par I have reviewed the images with the patient and made the following recommendations. I have recommended that the patient follow up in 6 months with a CT scan of the chest. \par \par Written by Sheri Mark NP, acting as a scribe for Lui Branch MD.\par The documentation recorded by the scribe accurately reflects the service I personally performed and the decisions made by me. Lui Branch MD\par \par

## 2020-02-27 NOTE — DISCHARGE NOTE ADULT - FUNCTIONAL SCREEN CURRENT LEVEL: AMBULATION, MLM
Attempted prior auth for Breo 200 and Ventolin thru cover my meds  3 = assistive equipment and person

## 2020-03-14 ENCOUNTER — TRANSCRIPTION ENCOUNTER (OUTPATIENT)
Age: 75
End: 2020-03-14

## 2020-06-02 ENCOUNTER — APPOINTMENT (OUTPATIENT)
Dept: THORACIC SURGERY | Facility: CLINIC | Age: 75
End: 2020-06-02
Payer: MEDICARE

## 2020-06-02 PROCEDURE — 99214 OFFICE O/P EST MOD 30 MIN: CPT

## 2020-06-02 NOTE — ASSESSMENT
[FreeTextEntry1] : Ms. Jung is a 75 year old female who presents today for follow up. She is s/p uniportal Left VATS, LLLobectomy on 11/21/16, pathology revealed T1aN0 adenocarcinoma, papillary predominant. \par \par CT Chest on 5/22/2020:\par - faint patchy peripheral ggo in the medial RLL\par \par I have reviewed the patient's medical records and diagnostic images at time of this office consultation and have made the following recommendation:\par 1. CT scan showed no evidence of recurrence, recommended patient to return to office in 1yr with CT Chest w/o contrast \par \par I spent 30 minutes on the phone speaking with patient regarding CT findings and plan of care.\par \par I personally performed the services described in the documentation, reviewed the documentation recorded by the scribe in my presence and it accurately and completely records my words and actions.\par \par I, Michelle Wiseman NP, am scribing for and the presence of Dr. TOVAR [facility], the following sections HISTORY OF PRESENT ILLNESS, PAST MEDICAL/FAMILY/SOCIAL HISTORY; REVIEW OF SYSTEMS; VITAL SIGNS; PHYSICAL EXAM; DISPOSITION.\par \par

## 2020-06-02 NOTE — HISTORY OF PRESENT ILLNESS
[Home] : at home, [unfilled] , at the time of the visit. [Medical Office: ([facility])___] : at the medical office located in  [Verbal consent obtained from patient] : the patient, [unfilled] [FreeTextEntry1] : Ms. Jung is a 75 year old female who presents today for follow up. She is s/p uniportal Left VATS, LLLobectomy on 11/21/16, pathology revealed T1aN0 adenocarcinoma, papillary predominant. \par \par Chest CT done 5/22/19 revealed:\par - s/p LLLobectomy\par - mild centrilobular pulmonary edema\par - no focal pulmonary masses or nodules\par - stable subcentimeter hypodense nodule right thyroid node\par - stable small 1 cm nodule of left adrenal gland\par \par CT Chest on 5/22/2020:\par - stable mild-to-moderate centrilobular emphysematous changes bilaterally\par - faint patchy peripheral ggo in the medial RLL\par - focally impacted bronchus in the Lt inferomedial lung (4:2-3) diminished in density\par \par Patient is followed today via Telephonic visit. Denies SOB, CP, or cough.

## 2020-06-02 NOTE — DATA REVIEWED
[FreeTextEntry1] : CT Chest on 5/22/2020:\par - stable mild-to-moderate centrilobular emphysematous changes bilaterally\par - faint patchy peripheral ggo in the medial RLL\par - focally impacted bronchus in the Lt inferomedial lung (4:2-3) diminished in density

## 2020-06-02 NOTE — CONSULT LETTER
[Consult Letter:] : I had the pleasure of evaluating your patient, [unfilled]. [Please see my note below.] : Please see my note below. [Consult Closing:] : Thank you very much for allowing me to participate in the care of this patient.  If you have any questions, please do not hesitate to contact me. [Sincerely,] : Sincerely, [FreeTextEntry2] : Dr. Montenegro (Pulm)\par Dr. PORSHA Pradhan (PCP)  [FreeTextEntry3] : Lui Branch MD, FACS \par Chief, Division of Thoracic Surgery \par Director, Minimally Invasive Thoracic Surgery \par Department of Cardiovascular and Thoracic Surgery \par [facility] \par , Cardiovascular and Thoracic Surgery\par

## 2021-03-07 NOTE — PROGRESS NOTE ADULT - PROBLEM SELECTOR PROBLEM 3
Normal vision: sees adequately in most situations; can see medication labels, newsprint
GERD (gastroesophageal reflux disease)
GERD (gastroesophageal reflux disease)

## 2021-06-04 ENCOUNTER — NON-APPOINTMENT (OUTPATIENT)
Age: 76
End: 2021-06-04

## 2021-06-15 ENCOUNTER — APPOINTMENT (OUTPATIENT)
Dept: THORACIC SURGERY | Facility: CLINIC | Age: 76
End: 2021-06-15
Payer: MEDICARE

## 2021-06-15 VITALS
OXYGEN SATURATION: 96 % | HEART RATE: 78 BPM | HEIGHT: 62 IN | TEMPERATURE: 98.7 F | BODY MASS INDEX: 27.6 KG/M2 | WEIGHT: 150 LBS | DIASTOLIC BLOOD PRESSURE: 81 MMHG | RESPIRATION RATE: 17 BRPM | SYSTOLIC BLOOD PRESSURE: 141 MMHG

## 2021-06-15 PROCEDURE — 99214 OFFICE O/P EST MOD 30 MIN: CPT

## 2021-06-15 NOTE — ASSESSMENT
[FreeTextEntry1] : Ms. Jung is a 75 year old female who presents today for follow up. She is s/p uniportal Left VATS, LLLobectomy on 11/21/16, pathology revealed T1aN0 adenocarcinoma, papillary predominant. \par \par I have independently reviewed the patient's medical records and diagnostic images at time of this office consultation and have made the following recommendation:\par 1. CT chest reviewed and explained to patient, patient is doing well, I recommended patient to return to office in 4-6 months with CT Chest without contrast. \par  \par Recommendations reviewed with patient during this office visit, and all questions answered; Patient instructed on the importance of follow up and verbalizes understanding. \par \par I personally performed the services described in the documentation, reviewed the documentation recorded by the scribe in my presence and it accurately and completely records my words and actions.\par \par I, LOUISA Madrid, am scribing for and in the presence of CRYSTAL Metz, the following sections HISTORY OF PRESENT ILLNESS, PAST MEDICAL/FAMILY/SOCIAL HISTORY; REVIEW OF SYSTEMS; VITAL SIGNS; PHYSICAL EXAM; DISPOSITION.\par

## 2021-06-15 NOTE — HISTORY OF PRESENT ILLNESS
[FreeTextEntry1] : Ms. Jugn is a 75 year old female who presents today for follow up. She is s/p uniportal Left VATS, LLLobectomy on 11/21/16, pathology revealed T1aN0 adenocarcinoma, papillary predominant. Breast cancer 2020, s/p lumpectomy and subsequent RT 2/2021.\par \par Chest CT done 5/22/19 revealed:\par - s/p LLLobectomy\par - mild centrilobular pulmonary edema\par - no focal pulmonary masses or nodules\par - stable subcentimeter hypodense nodule right thyroid node\par - stable small 1 cm nodule of left adrenal gland\par \par CT Chest on 5/22/2020:\par - stable mild-to-moderate centrilobular emphysematous changes bilaterally\par - faint patchy peripheral ggo in the medial RLL\par - focally impacted bronchus in the Lt inferomedial lung (4:2-3) diminished in density\par \par CT chest on 6/1/21:\par - Post op changes\par - Developing ground glass nodular density in the RUL (Series 4, Image [id number]); It blends to a group of blebs. Ther overall size measures approximately 1.7 cm\par - Previously reported mild ground glass in the RLL has resolved\par - Stable 2 mm peripheral nodule in Left upper lung (image 121) \par Stable punctate calcified nodule MEIR (image 108)\par - RLL nodule unchanged since 2018\par -  New left breast skin and trabecular thickening; postsurgical changes in the left axilla\par - Small hiatal hernia\par - s/p cholecystectomy \par \par Patient presents to office for follow up. Patient denies worsening shortness of breath, cough, chest pain, fever, chills, unintentional weight loss. She endorses in the last year she was diagnosed with breast cancer last year, now s/p Left lumpectomy 2/2021 s/p RT completed in 2/2021-Dr. Eladia Barron.\par

## 2021-06-15 NOTE — DATA REVIEWED
[FreeTextEntry1] : Independently reviewed the following imaging:\par - CT Chest on 5/22/2020\par - CT chest on 6/1/2021

## 2021-06-15 NOTE — CONSULT LETTER
[Dear  ___] : Dear  [unfilled], [Courtesy Letter:] : I had the pleasure of seeing your patient, [unfilled], in my office today. [Please see my note below.] : Please see my note below. [Sincerely,] : Sincerely, [DrCecelia  ___] : Dr. GUNN [FreeTextEntry2] : Dr. Montenegro (Pulm)\par Dr. PORSHA Pradhan (PCP)  [FreeTextEntry3] : Lui Branch MD, FACS \par Chief, Division of Thoracic Surgery \par Director, Minimally Invasive Thoracic Surgery \par Department of Cardiovascular and Thoracic Surgery \par [facility] \par , Cardiovascular and Thoracic Surgery\par \par \par

## 2021-06-15 NOTE — PHYSICAL EXAM
[] : no respiratory distress [Respiration, Rhythm And Depth] : normal respiratory rhythm and effort [Auscultation Breath Sounds / Voice Sounds] : lungs were clear to auscultation bilaterally [Heart Sounds] : normal S1 and S2 [Examination Of The Chest] : the chest was normal in appearance

## 2021-11-30 ENCOUNTER — APPOINTMENT (OUTPATIENT)
Dept: THORACIC SURGERY | Facility: CLINIC | Age: 76
End: 2021-11-30
Payer: MEDICARE

## 2021-12-07 ENCOUNTER — APPOINTMENT (OUTPATIENT)
Dept: THORACIC SURGERY | Facility: CLINIC | Age: 76
End: 2021-12-07
Payer: MEDICARE

## 2021-12-07 PROCEDURE — 99443: CPT

## 2021-12-07 NOTE — REASON FOR VISIT
[Medical Office: ([facility])___] : at the medical office located in  [Verbal consent obtained from patient] : the patient, [unfilled] [Home] : at home, [unfilled] , at the time of the visit.

## 2021-12-07 NOTE — CONSULT LETTER
[Consult Letter:] : I had the pleasure of evaluating your patient, [unfilled]. [( Thank you for referring [unfilled] for consultation for _____ )] : Thank you for referring [unfilled] for consultation for [unfilled] [Consult Closing:] : Thank you very much for allowing me to participate in the care of this patient.  If you have any questions, please do not hesitate to contact me. [FreeTextEntry2] : Dr. Montenegro (Pulm)\par Dr. PORSHA Pradhan (PCP)  [FreeTextEntry3] : Lui Branch MD, FACS \par Chief, Division of Thoracic Surgery \par Director, Minimally Invasive Thoracic Surgery \par Department of Cardiovascular and Thoracic Surgery \par [facility] \par , Cardiovascular and Thoracic Surgery\par \par \par

## 2021-12-07 NOTE — ASSESSMENT
[FreeTextEntry1] : Ms. Jung is a 76 year old female who presents today for follow up. She is s/p uniportal Left VATS, LLLobectomy on 11/21/16, pathology revealed T1aN0 adenocarcinoma, papillary predominant. Breast cancer 2020, s/p lumpectomy and subsequent RT 2/2021.\par \par I have reviewed the patient's medical records and diagnostic images at time of this office consultation and have made the following recommendation:\par 1. CT scan showed no evidence of recurrence, recommended patient to return to office in 6 mo w/ CT Chest w/o contrast.\par \par \par I personally performed the services described in the documentation, reviewed the documentation recorded by the scribe in my presence and it accurately and completely records my words and actions.\par \par I, Michelle Wiseman NP, am scribing for and the presence of CHRIS MetzIM, the following sections HISTORY OF PRESENT ILLNESS, PAST MEDICAL/FAMILY/SOCIAL HISTORY; REVIEW OF SYSTEMS; VITAL SIGNS; PHYSICAL EXAM; DISPOSITION.\par \par

## 2021-12-07 NOTE — HISTORY OF PRESENT ILLNESS
[FreeTextEntry1] : Ms. Jung is a 76 year old female who presents today for follow up. She is s/p uniportal Left VATS, LLLobectomy on 11/21/16, pathology revealed T1aN0 adenocarcinoma, papillary predominant. Breast cancer 2020, s/p lumpectomy and subsequent RT 2/2021.\par \par Chest CT done 5/22/19 revealed:\par - s/p LLLobectomy\par - mild centrilobular pulmonary edema\par - no focal pulmonary masses or nodules\par - stable subcentimeter hypodense nodule right thyroid node\par - stable small 1 cm nodule of left adrenal gland\par \par CT Chest on 5/22/2020:\par - stable mild-to-moderate centrilobular emphysematous changes bilaterally\par - faint patchy peripheral ggo in the medial RLL\par - focally impacted bronchus in the Lt inferomedial lung (4:2-3) diminished in density\par \par OF NOTE: Diagnosed with Breast cancer in 2020, s/p lumpectomy and subsequent RT 2/2021\par \par CT chest on 6/1/21:\par - Post op changes\par - Developing ground glass nodular density in the RUL (Series 4, Image [id number]); It blends to a group of blebs. Ther overall size measures approximately 1.7 cm\par - Previously reported mild ground glass in the RLL has resolved\par - Stable 2 mm peripheral nodule in Left upper lung (image 121) \par Stable punctate calcified nodule MEIR (image 108)\par - RLL nodule unchanged since 2018\par -  New left breast skin and trabecular thickening; postsurgical changes in the left axilla\par - Small hiatal hernia\par - s/p cholecystectomy \par \par CT chest on 11/8/2021: \par - Post op changes\par - Unchanged, mild groundglass adjacent to centrilobular emphysematous lucencies in the anterobasal RUL porgressed from 5/22/20, new from 3/7/2017\par - Coronary artery calcifications\par \par Patient presents to office for follow up. Doing well.\par \par \par \par

## 2022-05-02 NOTE — PATIENT PROFILE ADULT - NSPROGENOTHERPROVIDER_GEN_A_NUR
none Pt feeling better, vitals improved. Still likely viral syndrome. Strict return precautions and PCP follow up given.

## 2022-05-31 ENCOUNTER — APPOINTMENT (OUTPATIENT)
Dept: THORACIC SURGERY | Facility: CLINIC | Age: 77
End: 2022-05-31
Payer: MEDICARE

## 2022-05-31 VITALS
BODY MASS INDEX: 28.52 KG/M2 | DIASTOLIC BLOOD PRESSURE: 77 MMHG | HEART RATE: 105 BPM | HEIGHT: 62 IN | RESPIRATION RATE: 18 BRPM | WEIGHT: 155 LBS | OXYGEN SATURATION: 96 % | SYSTOLIC BLOOD PRESSURE: 144 MMHG

## 2022-05-31 PROCEDURE — 99214 OFFICE O/P EST MOD 30 MIN: CPT

## 2022-05-31 NOTE — HISTORY OF PRESENT ILLNESS
[FreeTextEntry1] : Ms. Jung is a 77 year old female who presents today for follow up. She is s/p uniportal Left VATS, LLLobectomy on 11/21/16, pathology revealed T1aN0 adenocarcinoma, papillary predominant. Breast cancer 2020, s/p lumpectomy and subsequent RT 2/2021.\par \par Chest CT done 5/22/19 revealed:\par - s/p LLLobectomy\par - mild centrilobular pulmonary edema\par - no focal pulmonary masses or nodules\par - stable subcentimeter hypodense nodule right thyroid node\par - stable small 1 cm nodule of left adrenal gland\par \par CT Chest on 5/22/2020:\par - stable mild-to-moderate centrilobular emphysematous changes bilaterally\par - faint patchy peripheral ggo in the medial RLL\par - focally impacted bronchus in the Lt inferomedial lung (4:2-3) diminished in density\par \par OF NOTE: Diagnosed with Breast cancer in 2020, s/p lumpectomy and subsequent RT 2/2021\par \par CT chest on 6/1/21:\par - Post op changes\par - Developing ground glass nodular density in the RUL (Series 4, Image [id number]); It blends to a group of blebs. Ther overall size measures approximately 1.7 cm\par - Previously reported mild ground glass in the RLL has resolved\par - Stable 2 mm peripheral nodule in Left upper lung (image 121) \par Stable punctate calcified nodule MEIR (image 108)\par - RLL nodule unchanged since 2018\par -  New left breast skin and trabecular thickening; postsurgical changes in the left axilla\par - Small hiatal hernia\par - s/p cholecystectomy \par \par CT chest on 11/8/2021: \par - Post op changes\par - Unchanged, mild groundglass adjacent to centrilobular emphysematous lucencies in the anterobasal RUL progressed from 5/22/20, new from 3/7/2017\par - Coronary artery calcifications\par \par CT chest on 5/19/22: \par - RUL groundglass density 2 x 2.1 cm; (11/8/2021: 1.8 x 1.9 cm; 5/2020: 9mm)\par - Left renal cysts \par \par Patient presents to office for follow up. Denies SOB, CP, cough.\par \par \par \par

## 2022-05-31 NOTE — ASSESSMENT
[FreeTextEntry1] : Ms. Jung is a 76 year old female who presents today for follow up. She is s/p uniportal Left VATS, LLLobectomy on 11/21/16, pathology revealed T1aN0 adenocarcinoma, papillary predominant. Breast cancer 2020, s/p lumpectomy and subsequent RT 2/2021.\par \par I have reviewed the patient's medical records and diagnostic images at time of this office consultation and have made the following recommendation:\par 1. CT scan in May 2022 showed no evidence of recurrence, recommended patient to return to office in 6mo w/ CT Chest w/o contrast.\par \par \par I personally performed the services described in the documentation, reviewed the documentation recorded by the scribe in my presence and it accurately and completely records my words and actions.\par \par I, Michelle Wiseman NP, am scribing for and the presence of CHRIS MetzIM, the following sections HISTORY OF PRESENT ILLNESS, PAST MEDICAL/FAMILY/SOCIAL HISTORY; REVIEW OF SYSTEMS; VITAL SIGNS; PHYSICAL EXAM; DISPOSITION.\par \par \par \par

## 2022-05-31 NOTE — CONSULT LETTER
[Dear  ___] : Dear  [unfilled], [Consult Letter:] : I had the pleasure of evaluating your patient, [unfilled]. [( Thank you for referring [unfilled] for consultation for _____ )] : Thank you for referring [unfilled] for consultation for [unfilled] [Please see my note below.] : Please see my note below. [Consult Closing:] : Thank you very much for allowing me to participate in the care of this patient.  If you have any questions, please do not hesitate to contact me. [Sincerely,] : Sincerely, [FreeTextEntry2] : Dr. Montenegro (Pulm)\par Dr. PORSHA Pradhan (PCP)  [FreeTextEntry3] : Lui Branch MD, FACS \par Chief, Division of Thoracic Surgery \par Director, Minimally Invasive Thoracic Surgery \par Department of Cardiovascular and Thoracic Surgery \par [facility] \par , Cardiovascular and Thoracic Surgery\par \par \par

## 2022-11-08 ENCOUNTER — APPOINTMENT (OUTPATIENT)
Dept: THORACIC SURGERY | Facility: CLINIC | Age: 77
End: 2022-11-08

## 2022-11-08 VITALS
OXYGEN SATURATION: 93 % | BODY MASS INDEX: 27.6 KG/M2 | HEART RATE: 78 BPM | HEIGHT: 62 IN | DIASTOLIC BLOOD PRESSURE: 76 MMHG | SYSTOLIC BLOOD PRESSURE: 154 MMHG | WEIGHT: 150 LBS

## 2022-11-08 PROCEDURE — 99214 OFFICE O/P EST MOD 30 MIN: CPT

## 2022-11-08 NOTE — CONSULT LETTER
[Dear  ___] : Dear  [unfilled], [Consult Letter:] : I had the pleasure of evaluating your patient, [unfilled]. [( Thank you for referring [unfilled] for consultation for _____ )] : Thank you for referring [unfilled] for consultation for [unfilled] [Please see my note below.] : Please see my note below. [Consult Closing:] : Thank you very much for allowing me to participate in the care of this patient.  If you have any questions, please do not hesitate to contact me. [Sincerely,] : Sincerely, [FreeTextEntry2] : Dr. Montenegro (Pulm)\par Dr. PORSHA Pradhan (PCP)  [FreeTextEntry3] : Lui Branch MD, FACS \par Chief, Division of Thoracic Surgery \par Director, Minimally Invasive Thoracic Surgery \par Department of Cardiovascular and Thoracic Surgery \par [facility] \par , Cardiovascular and Thoracic Surgery\par \par \par

## 2022-11-08 NOTE — HISTORY OF PRESENT ILLNESS
[FreeTextEntry1] : Ms. Jung is a 77 year old female who presents today for follow up. She is s/p uniportal Left VATS, LLLobectomy on 11/21/16, pathology revealed T1aN0 adenocarcinoma, papillary predominant. Breast cancer 2020, s/p lumpectomy and subsequent RT 2/2021.\par \par Chest CT done 5/22/19 revealed:\par - s/p LLLobectomy\par - mild centrilobular pulmonary edema\par - no focal pulmonary masses or nodules\par - stable subcentimeter hypodense nodule right thyroid node\par - stable small 1 cm nodule of left adrenal gland\par \par CT Chest on 5/22/2020:\par - stable mild-to-moderate centrilobular emphysematous changes bilaterally\par - faint patchy peripheral ggo in the medial RLL\par - focally impacted bronchus in the Lt inferomedial lung (4:2-3) diminished in density\par \par OF NOTE: Diagnosed with Breast cancer in 2020, s/p lumpectomy and subsequent RT 2/2021\par \par CT chest on 6/1/21:\par - Post op changes\par - Developing ground glass nodular density in the RUL (Series 4, Image [id number]); It blends to a group of blebs. Ther overall size measures approximately 1.7 cm\par - Previously reported mild ground glass in the RLL has resolved\par - Stable 2 mm peripheral nodule in Left upper lung (image 121) \par Stable punctate calcified nodule MEIR (image 108)\par - RLL nodule unchanged since 2018\par -  New left breast skin and trabecular thickening; postsurgical changes in the left axilla\par - Small hiatal hernia\par - s/p cholecystectomy \par \par CT chest on 11/8/2021: \par - Post op changes\par - Unchanged, mild groundglass adjacent to centrilobular emphysematous lucencies in the anterobasal RUL progressed from 5/22/20, new from 3/7/2017\par - Coronary artery calcifications\par \par CT chest on 5/19/22: \par - RUL groundglass density 2 x 2.1 cm; (11/8/2021: 1.8 x 1.9 cm; 5/2020: 9mm)\par - Left renal cysts \par \par CT Chest on 11/1/22: \par - Post op changes\par - RUL 2.2 x 2.3 cm groundglass nodule (Series 3, Image 95) Previously 2 cm\par - Stable anterior mediastinal LN: 1.4 x 0.6 cm \par \par Patient is here today for a follow up. Patient c/o dry cough and post-nasal drip for 4 months, was evaluated by ENT and given 2 courses of ABX and Steroids with no improvement. Patient denies shortness of breath, chest pain, fever, chills.

## 2022-11-08 NOTE — ASSESSMENT
[FreeTextEntry1] : Ms. Jung is a 77 year old female who presents today for follow up. She is s/p uniportal Left VATS, LLLobectomy on 11/21/16, pathology revealed T1aN0 adenocarcinoma, papillary predominant. Breast cancer 2020, s/p lumpectomy and subsequent RT 2/2021.\par \par  Diagnosed with Breast cancer in 2020, s/p lumpectomy and subsequent RT 2/2021\par \par I have reviewed the patient's medical records and diagnostic images at time of this office consultation and have made the following recommendation:\par 1. CT chest reviewed and explained to patient, I recommended patient to return to office in 6 months with CT Chest without contrast. \par 2. F/u with Pulmonologist, however, patient would like a recommendation of pulmonologist, Dr. Vasquez, Dr. Moore, . Trust contact information were given.  \par \par I, Dr. TOVAR [facility], personally performed the evaluation and management (E/M) services for this established patient who follow up today with an existing condition.  That E/M includes conducting the examination, assessing all new/exacerbated/existing conditions, and establishing a plan of care.  Today, my ACP, AMELIA Shin-C, was here to observe my evaluation and management services for this existing condition to be followed going forward.\par

## 2022-11-16 ENCOUNTER — APPOINTMENT (OUTPATIENT)
Dept: PULMONOLOGY | Facility: CLINIC | Age: 77
End: 2022-11-16

## 2022-11-16 ENCOUNTER — NON-APPOINTMENT (OUTPATIENT)
Age: 77
End: 2022-11-16

## 2022-11-16 VITALS
SYSTOLIC BLOOD PRESSURE: 143 MMHG | OXYGEN SATURATION: 96 % | HEART RATE: 88 BPM | DIASTOLIC BLOOD PRESSURE: 78 MMHG | HEIGHT: 62 IN | BODY MASS INDEX: 27.97 KG/M2 | WEIGHT: 152 LBS

## 2022-11-16 DIAGNOSIS — R05.9 COUGH, UNSPECIFIED: ICD-10-CM

## 2022-11-16 PROCEDURE — 94010 BREATHING CAPACITY TEST: CPT

## 2022-11-16 PROCEDURE — 99204 OFFICE O/P NEW MOD 45 MIN: CPT | Mod: 25

## 2022-11-16 RX ORDER — PREDNISONE 20 MG/1
20 TABLET ORAL
Qty: 14 | Refills: 0 | Status: COMPLETED | COMMUNITY
Start: 2022-09-19

## 2022-11-16 NOTE — ASSESSMENT
[FreeTextEntry1] : Persistent cough.  Has pulmonary nodule on chest CT but this is unlikely to be cause of cough.  Will follow "cough with normal x-ray protocol".  Has mild obstruction on spirometry could not do NIOX.  Start inhaled steroid.  Follow-up in 1 month.

## 2022-11-16 NOTE — HISTORY OF PRESENT ILLNESS
[Former] : former [Never] : never [TextBox_4] : Patient here for evaluation of cough has noted nonproductive cough for 4 months.  Cough occurs primarily during the day not at night.  Reports nasal drip and nasal congestion symptoms.  Treated with nasal steroids and antihistamines without improvement.  Had GI evaluation that reportedly negative did not get treated with medication for reflux.  Does not currently use asthma inhalers.  Former smoker.  Does not report shortness of breath fever or chills no weight loss or change in appetite. [YearQuit] : 2005

## 2022-11-16 NOTE — PHYSICAL EXAM
[No Acute Distress] : no acute distress [Low Lying Soft Palate] : low lying soft palate [Elongated Uvula] : elongated uvula [III] : Mallampati Class: III [Normal Appearance] : normal appearance [No Neck Mass] : no neck mass [Normal Rate/Rhythm] : normal rate/rhythm [Normal S1, S2] : normal s1, s2 [No Murmurs] : no murmurs [No Resp Distress] : no resp distress [Clear to Auscultation Bilaterally] : clear to auscultation bilaterally [No Abnormalities] : no abnormalities [Benign] : benign [Normal Gait] : normal gait [No Clubbing] : no clubbing [No Cyanosis] : no cyanosis [No Edema] : no edema [FROM] : FROM [Normal Color/ Pigmentation] : normal color/ pigmentation [No Focal Deficits] : no focal deficits [Oriented x3] : oriented x3 [Normal Affect] : normal affect

## 2022-11-16 NOTE — PROCEDURE
[FreeTextEntry1] : Spirometry demonstrates mild airways obstruction\par \par Chest CT noted for 2.3 cm groundglass nodule slightly increased in size from prior

## 2022-12-14 NOTE — H&P PST ADULT - NEGATIVE CARDIOVASCULAR SYMPTOMS
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      Review of scans suggesting component of gastric outlet obstruction.   repeat imaging done today 12/12/22  Small bowel series w/o evidence for gastric outlet obx      s/p Lutathera last week Wed. on radiation isolation through Wednesday/Tue    RECOMMENDATIONS   follow CBC   GI and surgical followup.    continue hydration and Zofran   Supportive measures.     DC plannng today.   Follow in office post DC    Thank you for consulting us.   No additional recommendations at current time.   Will sign off on case for now.   Please call, or re-consult if needed.  no paroxysmal nocturnal dyspnea/no claudication/no peripheral edema/no orthopnea/no chest pain/no dyspnea on exertion/no palpitations

## 2022-12-28 ENCOUNTER — APPOINTMENT (OUTPATIENT)
Dept: PULMONOLOGY | Facility: CLINIC | Age: 77
End: 2022-12-28

## 2023-03-23 NOTE — PATIENT PROFILE ADULT - ...
Physical Therapy    Visit Type: initial evaluation  Precautions:  Medical precautions:  fall risk and swallowing precautions; standard precautions.  Pt admitted s/p fall x 2,  Confusion, vomiting and diarrhea ( pt undergoing colonoscopy prep). Dx with aspir pna, acute met encephalopathy, PANCHO, NSTEMI. CT head: neg  PMH: HTN, DM, CKD, dementia, spinal stenosis    Lines:     Basic: telemetry      Lines in chart and on patient reviewed, precautions maintained throughout session.  Hearing: hard of hearing and wears hearing aids right  Safety Measures: bed alarm and bed rails  SUBJECTIVE  Patient agreed to participate in therapy this date.  Patient / Family Goal: maximize function and return home    Pain     At onset of session (out of 10): 0     OBJECTIVE     Cognitive Status   Level of Consciousness   - alert  Arousal Alertness   - inconsistent responses to stimuli  Affect/Behavior    - confused and cooperative (Mildly confused when he becomes fatigued)  Orientation    - Oriented to: person and place (month)  Functional Communication   - Overall Status: within functional limits   - Forms of Communication: verbal  Attention Span    - Attention: appears intact  Following Direction   - follows one step commands with increased time  Transition Between Tasks   - transitions with cues  Memory   - impaired - decreased recall of biographical information - decreased recall of recent events - decreased short term memory  Safety Awareness/Insight   - impaired, decreased awareness of need for assistance and decreased awareness of need for safety  Awareness of Deficits   - decreased awareness of deficits    Vitals:  Pulse Ox (%): 99  Heart Rate (beats per minute): 68  Vitals stable during session    Skin: blisters  To right forearm.  Also edema to rt forearm      Range of Motion (ROM)   (degrees unless noted; active unless noted; norms in ( ); negative=lacking to 0, positive=beyond 0)  WFL: LUE, RUE  WFL: LLE, RLE    Strength  (out  of 5 unless noted, standard test position unless noted)   WFL: LUE, RUE  WFL: LLE, RLE  Hip:    - Flexion:        • Left: 4        • Right: 4-  Knee:    - Extension:        • Left: 5        • Right: 5  Ankle:    - Dorsiflexion:        • Left: 5        • Right: 5      Sitting Balance  (PARAM = base of support)  Static      Sitting static trial 1: pt sat 15 minutes with SBA.     - Trial 1 details: with double LE support, with back unsupported and without UE support  Dynamic      - Trial 1 details: reaches forward, reaches laterally to right, reaches laterally to left, reaches out of PARAM, with double LE support, with back unsupported, reaches with one hand, with single UE support and contact guard    Standing Balance  (PARAM = base of support)  Firm Surface: Double Leg      - Static, Eyes Open       : pt stood 1 minute x 2 trials using RW with CGA. Slight retro listing- facilitated forward wt shifting.       - Trial 1 details: with double UE support, with tactile cues and with verbal cues      Sensation/Dermatome Testing:    Intact: LUE light touch, RUE light touch, LLE light touch, RLE light touch    Bed Mobility  - Supine to sit: supervision, with verbal cues  Transfers  Assistive devices: gait belt, 2-wheeled walker  - Sit to stand: contact guard/touching/steadying assist, with tactile cues, with verbal cues (vc for hand placement)  - Stand to sit: contact guard/touching/steadying assist, with tactile cues, with verbal cues (vc for hand placment)    Ambulation / Gait  - Assistive device: gait belt and two-wheeled walker  - Distance (feet unless otherwise indicated): 40  - Assist Level: with tactile cues and with verbal cues (CGA to Portia)  - Surface: even  - Description: decreased khalida/pace, decreased step length left, decreased step length right, shuffling, loss of balance and step through  Pt with difficulty following cues to correct deviations.  As pt fatigued, BLEs began trembling and he appeared more confused  with more difficulty following instructions.  Pt denies dizziness or feeling weak/tired     Interventions     Training provided: bed mobility training, energy conservation, gait training, safety training, use of assistive device and transfer training    Skilled input: Verbal instruction/cues, tactile instruction/cues and posture correction  Verbal Consent: Writer verbally educated and received verbal consent for hand placement, positioning of patient, and techniques to be performed today from patient for clothing adjustments for techniques and therapist position for techniques as described above and how they are pertinent to the patient's plan of care.         ASSESSMENT   Impairments: strength, balance deficits, activity tolerance, safety awareness, cognition, endurance and decreased insight into deficits  Functional Limitations: all functional mobility     Discharge Recommendations   Recommendation for Discharge: PT IL: Patient requires 24 HOUR assistance to perform mobility and/or ADLs safely, Patient is appropriate for Physical Therapy 1-3 times per week (initial 24 hour assist for safety)        PT/OT Mobility Equipment for Discharge:    PT/OT ADL Equipment for Discharge: shower chair, possibly b/s commode  PT Identified Barriers to Discharge: some cognitive impairment        Pain at End of Session:   Pain: 0/10    • Skilled therapy is required to address these limitations in attempt to maximize the patient's independence.     • Predicted patient presentation: Low (stable) - Patient comorbidities and complexities, as defined above, will have little effect on progress for prescribed plan of care.    Education:   - Present and ready to learn: patient  Education provided during session:  - Results of above outlined education: Verbalizes understanding and Needs reinforcement    Patient at End of Session:   Location: in chair  Safety measures: alarm system in place/re-engaged, call light within reach and equipment  intact  Handoff to: nurse    PLAN   Suggestions for next session as indicated:      Interventions: balance, bed mobility, energy conservation, gait training, strengthening, safety education, HEP train/position, equipment eval/education, functional transfer training, patient/family training and stairs retraining  Agreement to plan and goals: patient agrees with goals and treatment plan        GOALS  Long Term Goals: (to be met by time of discharge from hospital)  Sit to supine: Patient will complete sit to supine modified independent and without cues.  Supine to sit: Patient will complete supine to sit modified independent and without cues.  Sit to stand: Patient will complete sit to stand transfer with 2-wheeled walker, supervision and without cues.   Stand to sit: Patient will complete stand to sit transfer with 2-wheeled walker, supervision and without cues.   Ambulation (even): Patient will ambulate on even surface for 100 feet with 2-wheeled walker, stand by assist and with minimal cues.     Documented in the chart in the following areas: Prior Level of Function. Assessment.      Therapy procedure time and total treatment time can be found documented on the Time Entry flowsheet   15-Oct-2018 10:50:03

## 2023-03-24 ENCOUNTER — APPOINTMENT (OUTPATIENT)
Dept: ULTRASOUND IMAGING | Facility: CLINIC | Age: 78
End: 2023-03-24
Payer: MEDICARE

## 2023-03-24 PROCEDURE — 93971 EXTREMITY STUDY: CPT | Mod: RT

## 2023-04-04 ENCOUNTER — APPOINTMENT (OUTPATIENT)
Dept: THORACIC SURGERY | Facility: CLINIC | Age: 78
End: 2023-04-04
Payer: MEDICARE

## 2023-04-04 VITALS
WEIGHT: 159 LBS | SYSTOLIC BLOOD PRESSURE: 153 MMHG | DIASTOLIC BLOOD PRESSURE: 79 MMHG | BODY MASS INDEX: 29.26 KG/M2 | OXYGEN SATURATION: 96 % | HEART RATE: 88 BPM | HEIGHT: 62 IN

## 2023-04-04 DIAGNOSIS — R91.1 SOLITARY PULMONARY NODULE: ICD-10-CM

## 2023-04-04 PROCEDURE — 99214 OFFICE O/P EST MOD 30 MIN: CPT

## 2023-04-04 NOTE — CONSULT LETTER
[Dear  ___] : Dear  [unfilled], [Consult Letter:] : I had the pleasure of evaluating your patient, [unfilled]. [( Thank you for referring [unfilled] for consultation for _____ )] : Thank you for referring [unfilled] for consultation for [unfilled] [Please see my note below.] : Please see my note below. [Consult Closing:] : Thank you very much for allowing me to participate in the care of this patient.  If you have any questions, please do not hesitate to contact me. [Sincerely,] : Sincerely, [FreeTextEntry2] : Dr. Montenegro (Pulm)\par Dr. PORSHA Pradhan (PCP)  [FreeTextEntry3] : Lui Branch MD, FACS \par Chief, Division of Thoracic Surgery \par Director, Minimally Invasive Thoracic Surgery \par Department of Cardiovascular and Thoracic Surgery \par [facility] \par , Cardiovascular and Thoracic Surgery\par \par \par

## 2023-04-04 NOTE — HISTORY OF PRESENT ILLNESS
[FreeTextEntry1] : Ms. Jung is a 78 year old female who presents today for follow up. She is s/p uniportal Left VATS, LLLobectomy on 11/21/16, pathology revealed T1aN0 adenocarcinoma, papillary predominant. Breast cancer 2020, s/p lumpectomy and subsequent RT 2/2021.\par \par Chest CT done 5/22/19 revealed:\par - s/p LLLobectomy\par - mild centrilobular pulmonary edema\par - no focal pulmonary masses or nodules\par - stable subcentimeter hypodense nodule right thyroid node\par - stable small 1 cm nodule of left adrenal gland\par \par CT Chest on 5/22/2020:\par - stable mild-to-moderate centrilobular emphysematous changes bilaterally\par - faint patchy peripheral ggo in the medial RLL\par - focally impacted bronchus in the Lt inferomedial lung (4:2-3) diminished in density\par \par OF NOTE: Diagnosed with Breast cancer in 2020, s/p lumpectomy and subsequent RT 2/2021\par \par CT chest on 6/1/21:\par - Post op changes\par - Developing ground glass nodular density in the RUL (Series 4, Image [id number]); It blends to a group of blebs. Ther overall size measures approximately 1.7 cm\par - Previously reported mild ground glass in the RLL has resolved\par - Stable 2 mm peripheral nodule in Left upper lung (image 121) \par Stable punctate calcified nodule MEIR (image 108)\par - RLL nodule unchanged since 2018\par -  New left breast skin and trabecular thickening; postsurgical changes in the left axilla\par - Small hiatal hernia\par - s/p cholecystectomy \par \par CT chest on 11/8/2021: \par - Post op changes\par - Unchanged, mild groundglass adjacent to centrilobular emphysematous lucencies in the anterobasal RUL progressed from 5/22/20, new from 3/7/2017\par - Coronary artery calcifications\par \par CT chest on 5/19/22: \par - RUL groundglass density 2 x 2.1 cm; (11/8/2021: 1.8 x 1.9 cm; 5/2020: 9mm)\par - Left renal cysts \par \par CT Chest on 11/1/22: \par - Post op changes\par - RUL 2.2 x 2.3 cm groundglass nodule (Series 3, Image 95) Previously 2 cm\par - Stable anterior mediastinal LN: 1.4 x 0.6 cm \par \par Spirometry on 11/16/2022: FVC 2.08, 87%; FEV1 1.33, 75%\par \par Recently seen at [facility] ED with c/o right leg pain, dx with right DVT and now on Eliquis. \par \par CT Chest on 03/24/2023 ([facility]):\par - There are patchy areas of groundglass opacity in te right chest anteriorly with some dependent atelectasis are noted, possible pneumonitis.\par - There is a small hiatal hernia. \par \par \par Patient is here today for 6 months follow up. Overall, she reports to be feeling well. Denies any chest pain, shortness of breath, cough, or hemoptysis.

## 2023-04-04 NOTE — DATA REVIEWED
[FreeTextEntry1] : I have independently reviewed the following:\par Spirometry on 11/16/2022\par CT Chest on 03/24/2023

## 2023-04-04 NOTE — ASSESSMENT
[FreeTextEntry1] : Ms. Jung is a 78 year old female who presents today for follow up. She is s/p uniportal Left VATS, LLLobectomy on 11/21/16, pathology revealed T1aN0 adenocarcinoma, papillary predominant. Breast cancer 2020, s/p lumpectomy and subsequent RT 2/2021.\par \par Diagnosed with Breast cancer in 2020, s/p lumpectomy and subsequent RT 2/2021\par \par I have reviewed the patient's medical records and diagnostic images at time of this office consultation and have made the following recommendation:\par 1. CT Chest reviewed with patient, stable findings. I recommend she returns to clinic in 6 months with CT Chest without contrast.\par 2. Continue follow up with PCP and pulm.\par \par Recommendations reviewed with patient during this office visit, and all questions answered; Patient instructed on the importance of follow up and verbalizes understanding. \par \par \par I, CRYSTAL Metz, personally performed the evaluation and management (E/M) services for this established patient. That E/M includes conducting the examination, assessing all new/exacerbated conditions, and establishing a new plan of care. Today, my ACP, Sarabjit Conrad NP, was here to observe my evaluation and management services for this new problem/exacerbated condition to be followed going forward.\par \par

## 2023-06-14 ENCOUNTER — APPOINTMENT (OUTPATIENT)
Dept: ULTRASOUND IMAGING | Facility: CLINIC | Age: 78
End: 2023-06-14
Payer: MEDICARE

## 2023-06-14 PROCEDURE — 93971 EXTREMITY STUDY: CPT | Mod: RT

## 2023-08-28 NOTE — DISCHARGE NOTE ADULT - REASON FOR ADMISSION
RIGHT KNEE PAIN  RIGHT KNEE END STAGE OSTEOARTHRITIS  RIGHT TOTAL KNEE REPLACEMENT Impaired days of the week: 3/7/intact/counting

## 2023-10-10 ENCOUNTER — APPOINTMENT (OUTPATIENT)
Dept: THORACIC SURGERY | Facility: CLINIC | Age: 78
End: 2023-10-10
Payer: MEDICARE

## 2023-10-10 VITALS
RESPIRATION RATE: 16 BRPM | WEIGHT: 150 LBS | HEIGHT: 62 IN | SYSTOLIC BLOOD PRESSURE: 157 MMHG | HEART RATE: 78 BPM | DIASTOLIC BLOOD PRESSURE: 76 MMHG | BODY MASS INDEX: 27.6 KG/M2 | OXYGEN SATURATION: 96 %

## 2023-10-10 PROCEDURE — 99214 OFFICE O/P EST MOD 30 MIN: CPT

## 2023-10-10 RX ORDER — APIXABAN 5 MG/1
5 TABLET, FILM COATED ORAL
Refills: 0 | Status: COMPLETED | COMMUNITY
End: 2023-10-10

## 2023-10-10 RX ORDER — ASPIRIN 81 MG
81 TABLET, DELAYED RELEASE (ENTERIC COATED) ORAL
Refills: 0 | Status: ACTIVE | COMMUNITY

## 2023-10-13 ENCOUNTER — NON-APPOINTMENT (OUTPATIENT)
Age: 78
End: 2023-10-13

## 2023-10-13 ENCOUNTER — APPOINTMENT (OUTPATIENT)
Dept: THORACIC SURGERY | Facility: CLINIC | Age: 78
End: 2023-10-13
Payer: MEDICARE

## 2023-10-13 DIAGNOSIS — R91.8 OTHER NONSPECIFIC ABNORMAL FINDING OF LUNG FIELD: ICD-10-CM

## 2023-10-13 PROCEDURE — 99443: CPT

## 2023-11-08 ENCOUNTER — OUTPATIENT (OUTPATIENT)
Dept: OUTPATIENT SERVICES | Facility: HOSPITAL | Age: 78
LOS: 1 days | End: 2023-11-08
Payer: MEDICARE

## 2023-11-08 ENCOUNTER — APPOINTMENT (OUTPATIENT)
Dept: NUCLEAR MEDICINE | Facility: HOSPITAL | Age: 78
End: 2023-11-08

## 2023-11-08 ENCOUNTER — APPOINTMENT (OUTPATIENT)
Dept: PULMONOLOGY | Facility: CLINIC | Age: 78
End: 2023-11-08
Payer: MEDICARE

## 2023-11-08 VITALS
HEIGHT: 61.81 IN | HEART RATE: 90 BPM | BODY MASS INDEX: 28.49 KG/M2 | WEIGHT: 154.8 LBS | OXYGEN SATURATION: 97 % | SYSTOLIC BLOOD PRESSURE: 147 MMHG | DIASTOLIC BLOOD PRESSURE: 84 MMHG

## 2023-11-08 DIAGNOSIS — Z90.2 ACQUIRED ABSENCE OF LUNG [PART OF]: Chronic | ICD-10-CM

## 2023-11-08 DIAGNOSIS — Z90.49 ACQUIRED ABSENCE OF OTHER SPECIFIED PARTS OF DIGESTIVE TRACT: Chronic | ICD-10-CM

## 2023-11-08 DIAGNOSIS — C34.90 MALIGNANT NEOPLASM OF UNSPECIFIED PART OF UNSPECIFIED BRONCHUS OR LUNG: ICD-10-CM

## 2023-11-08 DIAGNOSIS — Z98.890 OTHER SPECIFIED POSTPROCEDURAL STATES: Chronic | ICD-10-CM

## 2023-11-08 PROCEDURE — 94729 DIFFUSING CAPACITY: CPT

## 2023-11-08 PROCEDURE — 94726 PLETHYSMOGRAPHY LUNG VOLUMES: CPT

## 2023-11-08 PROCEDURE — 78597 LUNG PERFUSION DIFFERENTIAL: CPT | Mod: 26

## 2023-11-08 PROCEDURE — 94010 BREATHING CAPACITY TEST: CPT

## 2023-11-14 ENCOUNTER — APPOINTMENT (OUTPATIENT)
Dept: THORACIC SURGERY | Facility: CLINIC | Age: 78
End: 2023-11-14
Payer: MEDICARE

## 2023-11-14 VITALS
DIASTOLIC BLOOD PRESSURE: 73 MMHG | BODY MASS INDEX: 27.97 KG/M2 | HEART RATE: 92 BPM | WEIGHT: 152 LBS | OXYGEN SATURATION: 95 % | SYSTOLIC BLOOD PRESSURE: 163 MMHG | RESPIRATION RATE: 16 BRPM

## 2023-11-14 PROCEDURE — 99215 OFFICE O/P EST HI 40 MIN: CPT

## 2023-11-14 RX ORDER — ALBUTEROL SULFATE 90 UG/1
108 (90 BASE) INHALANT RESPIRATORY (INHALATION)
Refills: 0 | Status: COMPLETED | COMMUNITY
End: 2023-11-14

## 2023-11-14 RX ORDER — PANTOPRAZOLE SODIUM 40 MG/1
40 GRANULE, DELAYED RELEASE ORAL
Refills: 0 | Status: COMPLETED | COMMUNITY
End: 2023-11-14

## 2023-11-14 RX ORDER — FLUTICASONE PROPIONATE 110 UG/1
110 AEROSOL, METERED RESPIRATORY (INHALATION) TWICE DAILY
Qty: 1 | Refills: 5 | Status: COMPLETED | COMMUNITY
Start: 2022-11-16 | End: 2023-11-14

## 2023-11-30 ENCOUNTER — OUTPATIENT (OUTPATIENT)
Dept: OUTPATIENT SERVICES | Facility: HOSPITAL | Age: 78
LOS: 1 days | End: 2023-11-30

## 2023-11-30 VITALS
RESPIRATION RATE: 14 BRPM | SYSTOLIC BLOOD PRESSURE: 139 MMHG | WEIGHT: 149.91 LBS | HEART RATE: 89 BPM | DIASTOLIC BLOOD PRESSURE: 69 MMHG | OXYGEN SATURATION: 96 % | HEIGHT: 62 IN | TEMPERATURE: 98 F

## 2023-11-30 DIAGNOSIS — C34.90 MALIGNANT NEOPLASM OF UNSPECIFIED PART OF UNSPECIFIED BRONCHUS OR LUNG: ICD-10-CM

## 2023-11-30 DIAGNOSIS — H26.9 UNSPECIFIED CATARACT: Chronic | ICD-10-CM

## 2023-11-30 DIAGNOSIS — Z90.2 ACQUIRED ABSENCE OF LUNG [PART OF]: Chronic | ICD-10-CM

## 2023-11-30 DIAGNOSIS — Z98.890 OTHER SPECIFIED POSTPROCEDURAL STATES: Chronic | ICD-10-CM

## 2023-11-30 DIAGNOSIS — Z90.49 ACQUIRED ABSENCE OF OTHER SPECIFIED PARTS OF DIGESTIVE TRACT: Chronic | ICD-10-CM

## 2023-11-30 DIAGNOSIS — Z98.89 OTHER SPECIFIED POSTPROCEDURAL STATES: Chronic | ICD-10-CM

## 2023-11-30 DIAGNOSIS — Z96.651 PRESENCE OF RIGHT ARTIFICIAL KNEE JOINT: Chronic | ICD-10-CM

## 2023-11-30 LAB
ALBUMIN SERPL ELPH-MCNC: 4 G/DL — SIGNIFICANT CHANGE UP (ref 3.3–5)
ALBUMIN SERPL ELPH-MCNC: 4 G/DL — SIGNIFICANT CHANGE UP (ref 3.3–5)
ALP SERPL-CCNC: 86 U/L — SIGNIFICANT CHANGE UP (ref 40–120)
ALP SERPL-CCNC: 86 U/L — SIGNIFICANT CHANGE UP (ref 40–120)
ALT FLD-CCNC: 12 U/L — SIGNIFICANT CHANGE UP (ref 4–33)
ALT FLD-CCNC: 12 U/L — SIGNIFICANT CHANGE UP (ref 4–33)
ANION GAP SERPL CALC-SCNC: 11 MMOL/L — SIGNIFICANT CHANGE UP (ref 7–14)
ANION GAP SERPL CALC-SCNC: 11 MMOL/L — SIGNIFICANT CHANGE UP (ref 7–14)
AST SERPL-CCNC: 13 U/L — SIGNIFICANT CHANGE UP (ref 4–32)
AST SERPL-CCNC: 13 U/L — SIGNIFICANT CHANGE UP (ref 4–32)
BILIRUB SERPL-MCNC: 0.4 MG/DL — SIGNIFICANT CHANGE UP (ref 0.2–1.2)
BILIRUB SERPL-MCNC: 0.4 MG/DL — SIGNIFICANT CHANGE UP (ref 0.2–1.2)
BLD GP AB SCN SERPL QL: NEGATIVE — SIGNIFICANT CHANGE UP
BLD GP AB SCN SERPL QL: NEGATIVE — SIGNIFICANT CHANGE UP
BUN SERPL-MCNC: 17 MG/DL — SIGNIFICANT CHANGE UP (ref 7–23)
BUN SERPL-MCNC: 17 MG/DL — SIGNIFICANT CHANGE UP (ref 7–23)
CALCIUM SERPL-MCNC: 9.4 MG/DL — SIGNIFICANT CHANGE UP (ref 8.4–10.5)
CALCIUM SERPL-MCNC: 9.4 MG/DL — SIGNIFICANT CHANGE UP (ref 8.4–10.5)
CHLORIDE SERPL-SCNC: 102 MMOL/L — SIGNIFICANT CHANGE UP (ref 98–107)
CHLORIDE SERPL-SCNC: 102 MMOL/L — SIGNIFICANT CHANGE UP (ref 98–107)
CO2 SERPL-SCNC: 26 MMOL/L — SIGNIFICANT CHANGE UP (ref 22–31)
CO2 SERPL-SCNC: 26 MMOL/L — SIGNIFICANT CHANGE UP (ref 22–31)
CREAT SERPL-MCNC: 0.76 MG/DL — SIGNIFICANT CHANGE UP (ref 0.5–1.3)
CREAT SERPL-MCNC: 0.76 MG/DL — SIGNIFICANT CHANGE UP (ref 0.5–1.3)
EGFR: 79 ML/MIN/1.73M2 — SIGNIFICANT CHANGE UP
EGFR: 79 ML/MIN/1.73M2 — SIGNIFICANT CHANGE UP
GLUCOSE SERPL-MCNC: 99 MG/DL — SIGNIFICANT CHANGE UP (ref 70–99)
GLUCOSE SERPL-MCNC: 99 MG/DL — SIGNIFICANT CHANGE UP (ref 70–99)
HCT VFR BLD CALC: 37.1 % — SIGNIFICANT CHANGE UP (ref 34.5–45)
HCT VFR BLD CALC: 37.1 % — SIGNIFICANT CHANGE UP (ref 34.5–45)
HGB BLD-MCNC: 12.5 G/DL — SIGNIFICANT CHANGE UP (ref 11.5–15.5)
HGB BLD-MCNC: 12.5 G/DL — SIGNIFICANT CHANGE UP (ref 11.5–15.5)
MCHC RBC-ENTMCNC: 31.1 PG — SIGNIFICANT CHANGE UP (ref 27–34)
MCHC RBC-ENTMCNC: 31.1 PG — SIGNIFICANT CHANGE UP (ref 27–34)
MCHC RBC-ENTMCNC: 33.7 GM/DL — SIGNIFICANT CHANGE UP (ref 32–36)
MCHC RBC-ENTMCNC: 33.7 GM/DL — SIGNIFICANT CHANGE UP (ref 32–36)
MCV RBC AUTO: 92.3 FL — SIGNIFICANT CHANGE UP (ref 80–100)
MCV RBC AUTO: 92.3 FL — SIGNIFICANT CHANGE UP (ref 80–100)
NRBC # BLD: 0 /100 WBCS — SIGNIFICANT CHANGE UP (ref 0–0)
NRBC # BLD: 0 /100 WBCS — SIGNIFICANT CHANGE UP (ref 0–0)
NRBC # FLD: 0 K/UL — SIGNIFICANT CHANGE UP (ref 0–0)
NRBC # FLD: 0 K/UL — SIGNIFICANT CHANGE UP (ref 0–0)
PLATELET # BLD AUTO: 256 K/UL — SIGNIFICANT CHANGE UP (ref 150–400)
PLATELET # BLD AUTO: 256 K/UL — SIGNIFICANT CHANGE UP (ref 150–400)
POTASSIUM SERPL-MCNC: 4 MMOL/L — SIGNIFICANT CHANGE UP (ref 3.5–5.3)
POTASSIUM SERPL-MCNC: 4 MMOL/L — SIGNIFICANT CHANGE UP (ref 3.5–5.3)
POTASSIUM SERPL-SCNC: 4 MMOL/L — SIGNIFICANT CHANGE UP (ref 3.5–5.3)
POTASSIUM SERPL-SCNC: 4 MMOL/L — SIGNIFICANT CHANGE UP (ref 3.5–5.3)
PROT SERPL-MCNC: 6.7 G/DL — SIGNIFICANT CHANGE UP (ref 6–8.3)
PROT SERPL-MCNC: 6.7 G/DL — SIGNIFICANT CHANGE UP (ref 6–8.3)
RBC # BLD: 4.02 M/UL — SIGNIFICANT CHANGE UP (ref 3.8–5.2)
RBC # BLD: 4.02 M/UL — SIGNIFICANT CHANGE UP (ref 3.8–5.2)
RBC # FLD: 12 % — SIGNIFICANT CHANGE UP (ref 10.3–14.5)
RBC # FLD: 12 % — SIGNIFICANT CHANGE UP (ref 10.3–14.5)
RH IG SCN BLD-IMP: POSITIVE — SIGNIFICANT CHANGE UP
SODIUM SERPL-SCNC: 139 MMOL/L — SIGNIFICANT CHANGE UP (ref 135–145)
SODIUM SERPL-SCNC: 139 MMOL/L — SIGNIFICANT CHANGE UP (ref 135–145)
WBC # BLD: 9.73 K/UL — SIGNIFICANT CHANGE UP (ref 3.8–10.5)
WBC # BLD: 9.73 K/UL — SIGNIFICANT CHANGE UP (ref 3.8–10.5)
WBC # FLD AUTO: 9.73 K/UL — SIGNIFICANT CHANGE UP (ref 3.8–10.5)
WBC # FLD AUTO: 9.73 K/UL — SIGNIFICANT CHANGE UP (ref 3.8–10.5)

## 2023-11-30 RX ORDER — AMLODIPINE BESYLATE 2.5 MG/1
1 TABLET ORAL
Qty: 0 | Refills: 0 | DISCHARGE

## 2023-11-30 RX ORDER — TRAZODONE HCL 50 MG
0 TABLET ORAL
Qty: 0 | Refills: 0 | DISCHARGE

## 2023-11-30 RX ORDER — L.ACIDOPH/B.ANIMALIS/B.LONGUM 15B CELL
1 CAPSULE ORAL
Qty: 0 | Refills: 0 | DISCHARGE

## 2023-11-30 NOTE — H&P PST ADULT - PROBLEM SELECTOR PLAN 1
Pt scheduled for flexible bronchoscopy, right VATS, right upper lobectomy, possible thoracotomy with epidural on 12/6/2023. labs done results pending, ekg in chart.  Chlorhexidine provided-  written and verbal instructions given, pt able to verbalize understanding.  Preop teaching done, pt able to verbalize understanding.   medication day of procedure-  amlodipine, pepcid  pst request   echo 2023 [phone] Pt scheduled for flexible bronchoscopy, right VATS, right upper lobectomy, possible thoracotomy with epidural on 12/6/2023. labs done results pending, ekg in chart.  Chlorhexidine provided-  written and verbal instructions given, pt able to verbalize understanding.  Preop teaching done, pt able to verbalize understanding.   medication day of procedure-  amlodipine, pepcid  pst request   echo 2023 [phone] Pt scheduled for flexible bronchoscopy, right VATS, right upper lobectomy, possible thoracotomy with epidural on 12/6/2023. labs done results pending, ekg in chart.  Chlorhexidine provided-  written and verbal instructions given, pt able to verbalize understanding.  Preop teaching done, pt able to verbalize understanding.   medication day of procedure-  amlodipine, pepcid  pst request   echo 2023 [phone]

## 2023-11-30 NOTE — H&P PST ADULT - PRIMARY CARE PROVIDER
Dr. Patric Pradhan pmd [phone] Dr. Patric Pradhan pmd [phone] Dr. Patric Pardhan pmd [phone]

## 2023-11-30 NOTE — H&P PST ADULT - NSICDXPASTSURGICALHX_GEN_ALL_CORE_FT
PAST SURGICAL HISTORY:  H/O endoscopy April 2015    S/P cholecystectomy 25 years ago    S/P excision of lipoma 8/2015    S/P lobectomy of lung right lobe with 2 nodules     PAST SURGICAL HISTORY:  Cataract     H/O endoscopy April 2015    S/P cholecystectomy 25 years ago    S/P excision of lipoma 8/2015    S/P lumpectomy, left breast     S/P total knee replacement, right     Status post lung surgery

## 2023-11-30 NOTE — H&P PST ADULT - ATTENDING PHYSICIAN: I HAVE REVIEWED THE CLINICAL DOCUMENTATION AND AGREE WITH THE ABOVE NOTE
Patient discharged to home in stable conditon.  Written and verbal after care 
instructions given. 

Patient verbalizes understanding of instructions. Pt A/O X 4 and denied any 
suicidal ideation upon d/c. Pt ambulated out of ER with steady gait. Stressed 
follow up. ventricular response with patient having occasional positives  Acute kidney injury with creatinine 1.6  Elevated BNP  Leukocytosis  HLD  HTN  Depression, Anxiety  Moderate mitral regurge  Hx parathyroidectomy  Hx carotid endarterectomy         Pulm note reviewed  TMJ has improved  Renal function better  Still not using his bipap  Possible repeat tap today  See orders  Will discuss with attending    Robert Schmid  7:57 AM    The chart was reviewed and the patient was seen and examined with the resident. The case was discussed in detail with the resident and agree with current impressions and plan. Consult Dr Sidney Walsh for CT.      Archana Sultana D.O.  11:19 AM  9/11/2018 Statement Selected

## 2023-11-30 NOTE — H&P PST ADULT - GASTROINTESTINAL
details… normal/soft/nontender/nondistended/normal active bowel sounds/no guarding/no rigidity/no organomegaly/no palpable angie/no masses palpable

## 2023-11-30 NOTE — H&P PST ADULT - NSICDXPASTMEDICALHX_GEN_ALL_CORE_FT
PAST MEDICAL HISTORY:  Anxiety     Lal's esophagus     GERD (gastroesophageal reflux disease)     Hiatal hernia     Hyperlipidemia     IBS (irritable bowel syndrome)     Parotid gland enlargement h/o biopsy 4 years ago and MRI 2015 with no changes    Postnasal drip     Right leg DVT     Solitary pulmonary nodule      PAST MEDICAL HISTORY:  Adenocarcinoma     Anxiety     Lal's esophagus     Breast cancer, left     Emphysema lung     GERD (gastroesophageal reflux disease)     Hiatal hernia     Hyperlipidemia     IBS (irritable bowel syndrome)     Insomnia     Malignant lung neoplasm     Parotid gland enlargement h/o biopsy 4 years ago and MRI 2015 with no changes    Right leg DVT     S/P radiation therapy     Solitary pulmonary nodule

## 2023-11-30 NOTE — H&P PST ADULT - RESPIRATORY AND THORAX COMMENTS
chronic non productive cough,, right lung nodule chronic non productive cough,, right lung nodule, hx left lower lobectomy for adenocarcinoma 2016

## 2023-11-30 NOTE — H&P PST ADULT - OTHER CARE PROVIDERS
Dr. Arnold cardiologist [phone] Dr. Arnold cardiologist [phone] Dr. Arnold cardiologist [phone]

## 2023-11-30 NOTE — H&P PST ADULT - HISTORY OF PRESENT ILLNESS
79y/o female scheduled for flexible bronchoscopy, right VATS, right upper lobectomy, possible thoracotomy with epidural on 12/6/2023.  Pt states, "hx of left Vats, LLLobectomy 2016 dx with adenocarcinoma, left breast cancer s/p lumpectomy radiation 2020,  right lung mass for 2 yrs, recent imaging shows increase in size."

## 2023-12-05 ENCOUNTER — TRANSCRIPTION ENCOUNTER (OUTPATIENT)
Age: 78
End: 2023-12-05

## 2023-12-05 NOTE — ASU PATIENT PROFILE, ADULT - FALL HARM RISK - UNIVERSAL INTERVENTIONS
Bed in lowest position, wheels locked, appropriate side rails in place/Call bell, personal items and telephone in reach/Instruct patient to call for assistance before getting out of bed or chair/Non-slip footwear when patient is out of bed/[place] to call system/Physically safe environment - no spills, clutter or unnecessary equipment/Purposeful Proactive Rounding/Room/bathroom lighting operational, light cord in reach Bed in lowest position, wheels locked, appropriate side rails in place/Call bell, personal items and telephone in reach/Instruct patient to call for assistance before getting out of bed or chair/Non-slip footwear when patient is out of bed/[place] to call system/Physically safe environment - no spills, clutter or unnecessary equipment/Purposeful Proactive Rounding/Room/bathroom lighting operational, light cord in reach

## 2023-12-06 ENCOUNTER — INPATIENT (INPATIENT)
Facility: HOSPITAL | Age: 78
LOS: 2 days | Discharge: HOME | End: 2023-12-09
Attending: THORACIC SURGERY (CARDIOTHORACIC VASCULAR SURGERY) | Admitting: THORACIC SURGERY (CARDIOTHORACIC VASCULAR SURGERY)
Payer: MEDICARE

## 2023-12-06 ENCOUNTER — RESULT REVIEW (OUTPATIENT)
Age: 78
End: 2023-12-06

## 2023-12-06 ENCOUNTER — APPOINTMENT (OUTPATIENT)
Dept: THORACIC SURGERY | Facility: HOSPITAL | Age: 78
End: 2023-12-06

## 2023-12-06 ENCOUNTER — TRANSCRIPTION ENCOUNTER (OUTPATIENT)
Age: 78
End: 2023-12-06

## 2023-12-06 ENCOUNTER — NON-APPOINTMENT (OUTPATIENT)
Age: 78
End: 2023-12-06

## 2023-12-06 VITALS
TEMPERATURE: 99 F | HEART RATE: 97 BPM | HEIGHT: 62 IN | OXYGEN SATURATION: 96 % | WEIGHT: 149.91 LBS | DIASTOLIC BLOOD PRESSURE: 58 MMHG | SYSTOLIC BLOOD PRESSURE: 127 MMHG | RESPIRATION RATE: 18 BRPM

## 2023-12-06 DIAGNOSIS — Z98.890 OTHER SPECIFIED POSTPROCEDURAL STATES: Chronic | ICD-10-CM

## 2023-12-06 DIAGNOSIS — H26.9 UNSPECIFIED CATARACT: Chronic | ICD-10-CM

## 2023-12-06 DIAGNOSIS — Z90.49 ACQUIRED ABSENCE OF OTHER SPECIFIED PARTS OF DIGESTIVE TRACT: Chronic | ICD-10-CM

## 2023-12-06 DIAGNOSIS — Z98.89 OTHER SPECIFIED POSTPROCEDURAL STATES: Chronic | ICD-10-CM

## 2023-12-06 DIAGNOSIS — Z96.651 PRESENCE OF RIGHT ARTIFICIAL KNEE JOINT: Chronic | ICD-10-CM

## 2023-12-06 DIAGNOSIS — C34.90 MALIGNANT NEOPLASM OF UNSPECIFIED PART OF UNSPECIFIED BRONCHUS OR LUNG: ICD-10-CM

## 2023-12-06 LAB
GLUCOSE BLDC GLUCOMTR-MCNC: 217 MG/DL — HIGH (ref 70–99)
GLUCOSE BLDC GLUCOMTR-MCNC: 217 MG/DL — HIGH (ref 70–99)

## 2023-12-06 PROCEDURE — 88309 TISSUE EXAM BY PATHOLOGIST: CPT | Mod: 26

## 2023-12-06 PROCEDURE — 32669 THORACOSCOPY REMOVE SEGMENT: CPT | Mod: RT

## 2023-12-06 PROCEDURE — 71045 X-RAY EXAM CHEST 1 VIEW: CPT | Mod: 26

## 2023-12-06 PROCEDURE — 88332 PATH CONSLTJ SURG EA ADD BLK: CPT | Mod: 26

## 2023-12-06 PROCEDURE — 88331 PATH CONSLTJ SURG 1 BLK 1SPC: CPT | Mod: 26

## 2023-12-06 PROCEDURE — 88312 SPECIAL STAINS GROUP 1: CPT | Mod: 26

## 2023-12-06 DEVICE — STAPLER ETHICON ECHELON ENDOPATH GRIP SURFACE 45MM BLACK RELOAD: Type: IMPLANTABLE DEVICE | Site: RIGHT | Status: FUNCTIONAL

## 2023-12-06 DEVICE — STAPLER ETHICON GST ECHELON 45MM GREEN RELOAD: Type: IMPLANTABLE DEVICE | Site: RIGHT | Status: FUNCTIONAL

## 2023-12-06 DEVICE — PROGEL PLEURAL AIR LEAK 4ML: Type: IMPLANTABLE DEVICE | Site: RIGHT | Status: FUNCTIONAL

## 2023-12-06 DEVICE — STAPLER ETHICON GST ECHELON 45MM GOLD RELOAD: Type: IMPLANTABLE DEVICE | Site: RIGHT | Status: FUNCTIONAL

## 2023-12-06 DEVICE — CHEST DRAIN THORACIC ARGYLE PVC 20FR STRAIGHT: Type: IMPLANTABLE DEVICE | Site: RIGHT | Status: FUNCTIONAL

## 2023-12-06 DEVICE — STAPLER ETHICON ECHELON ENDOPATH VASCULAR 35MM WHITE RELOAD: Type: IMPLANTABLE DEVICE | Site: RIGHT | Status: FUNCTIONAL

## 2023-12-06 DEVICE — IMPLANTABLE DEVICE: Type: IMPLANTABLE DEVICE | Site: RIGHT | Status: FUNCTIONAL

## 2023-12-06 RX ORDER — ASPIRIN/CALCIUM CARB/MAGNESIUM 324 MG
1 TABLET ORAL
Refills: 0 | DISCHARGE

## 2023-12-06 RX ORDER — FLUTICASONE PROPIONATE 50 MCG
2 SPRAY, SUSPENSION NASAL EVERY 12 HOURS
Refills: 0 | Status: DISCONTINUED | OUTPATIENT
Start: 2023-12-06 | End: 2023-12-09

## 2023-12-06 RX ORDER — FLUTICASONE PROPIONATE 50 MCG
1 SPRAY, SUSPENSION NASAL
Refills: 0 | DISCHARGE

## 2023-12-06 RX ORDER — HYDROMORPHONE HYDROCHLORIDE 2 MG/ML
0.5 INJECTION INTRAMUSCULAR; INTRAVENOUS; SUBCUTANEOUS
Refills: 0 | Status: DISCONTINUED | OUTPATIENT
Start: 2023-12-06 | End: 2023-12-07

## 2023-12-06 RX ORDER — ASPIRIN/CALCIUM CARB/MAGNESIUM 324 MG
81 TABLET ORAL DAILY
Refills: 0 | Status: DISCONTINUED | OUTPATIENT
Start: 2023-12-07 | End: 2023-12-09

## 2023-12-06 RX ORDER — ATORVASTATIN CALCIUM 80 MG/1
1 TABLET, FILM COATED ORAL
Refills: 0 | DISCHARGE

## 2023-12-06 RX ORDER — HEPARIN SODIUM 5000 [USP'U]/ML
5000 INJECTION INTRAVENOUS; SUBCUTANEOUS EVERY 8 HOURS
Refills: 0 | Status: DISCONTINUED | OUTPATIENT
Start: 2023-12-06 | End: 2023-12-09

## 2023-12-06 RX ORDER — GABAPENTIN 400 MG/1
1 CAPSULE ORAL
Refills: 0 | DISCHARGE

## 2023-12-06 RX ORDER — ALPRAZOLAM 0.25 MG
0.5 TABLET ORAL
Refills: 0 | DISCHARGE

## 2023-12-06 RX ORDER — TRAZODONE HCL 50 MG
1 TABLET ORAL
Refills: 0 | DISCHARGE

## 2023-12-06 RX ORDER — HYDROMORPHONE HYDROCHLORIDE 2 MG/ML
30 INJECTION INTRAMUSCULAR; INTRAVENOUS; SUBCUTANEOUS
Refills: 0 | Status: DISCONTINUED | OUTPATIENT
Start: 2023-12-06 | End: 2023-12-07

## 2023-12-06 RX ORDER — SODIUM CHLORIDE 9 MG/ML
250 INJECTION INTRAMUSCULAR; INTRAVENOUS; SUBCUTANEOUS ONCE
Refills: 0 | Status: COMPLETED | OUTPATIENT
Start: 2023-12-06 | End: 2023-12-06

## 2023-12-06 RX ORDER — ATORVASTATIN CALCIUM 80 MG/1
10 TABLET, FILM COATED ORAL AT BEDTIME
Refills: 0 | Status: DISCONTINUED | OUTPATIENT
Start: 2023-12-06 | End: 2023-12-09

## 2023-12-06 RX ORDER — ONDANSETRON 8 MG/1
4 TABLET, FILM COATED ORAL EVERY 6 HOURS
Refills: 0 | Status: DISCONTINUED | OUTPATIENT
Start: 2023-12-06 | End: 2023-12-06

## 2023-12-06 RX ORDER — ONDANSETRON 8 MG/1
4 TABLET, FILM COATED ORAL EVERY 6 HOURS
Refills: 0 | Status: DISCONTINUED | OUTPATIENT
Start: 2023-12-06 | End: 2023-12-09

## 2023-12-06 RX ORDER — SODIUM CHLORIDE 9 MG/ML
1000 INJECTION, SOLUTION INTRAVENOUS
Refills: 0 | Status: DISCONTINUED | OUTPATIENT
Start: 2023-12-06 | End: 2023-12-07

## 2023-12-06 RX ORDER — AMLODIPINE BESYLATE 2.5 MG/1
1 TABLET ORAL
Refills: 0 | DISCHARGE

## 2023-12-06 RX ORDER — LETROZOLE 2.5 MG/1
1 TABLET, FILM COATED ORAL
Refills: 0 | DISCHARGE

## 2023-12-06 RX ORDER — TRAZODONE HCL 50 MG
100 TABLET ORAL AT BEDTIME
Refills: 0 | Status: DISCONTINUED | OUTPATIENT
Start: 2023-12-06 | End: 2023-12-09

## 2023-12-06 RX ORDER — GABAPENTIN 400 MG/1
100 CAPSULE ORAL AT BEDTIME
Refills: 0 | Status: DISCONTINUED | OUTPATIENT
Start: 2023-12-06 | End: 2023-12-09

## 2023-12-06 RX ORDER — CALCIUM CARBONATE 500(1250)
0 TABLET ORAL
Refills: 0 | DISCHARGE

## 2023-12-06 RX ORDER — NALBUPHINE HYDROCHLORIDE 10 MG/ML
2.5 INJECTION, SOLUTION INTRAMUSCULAR; INTRAVENOUS; SUBCUTANEOUS EVERY 6 HOURS
Refills: 0 | Status: DISCONTINUED | OUTPATIENT
Start: 2023-12-06 | End: 2023-12-07

## 2023-12-06 RX ORDER — NALOXONE HYDROCHLORIDE 4 MG/.1ML
0.1 SPRAY NASAL
Refills: 0 | Status: DISCONTINUED | OUTPATIENT
Start: 2023-12-06 | End: 2023-12-09

## 2023-12-06 RX ORDER — LETROZOLE 2.5 MG/1
2.5 TABLET, FILM COATED ORAL DAILY
Refills: 0 | Status: DISCONTINUED | OUTPATIENT
Start: 2023-12-06 | End: 2023-12-09

## 2023-12-06 RX ORDER — NALOXONE HYDROCHLORIDE 4 MG/.1ML
0.1 SPRAY NASAL
Refills: 0 | Status: DISCONTINUED | OUTPATIENT
Start: 2023-12-06 | End: 2023-12-06

## 2023-12-06 RX ORDER — ACETAMINOPHEN 500 MG
975 TABLET ORAL ONCE
Refills: 0 | Status: COMPLETED | OUTPATIENT
Start: 2023-12-06 | End: 2023-12-06

## 2023-12-06 RX ADMIN — HYDROMORPHONE HYDROCHLORIDE 30 MILLILITER(S): 2 INJECTION INTRAMUSCULAR; INTRAVENOUS; SUBCUTANEOUS at 21:03

## 2023-12-06 RX ADMIN — ONDANSETRON 4 MILLIGRAM(S): 8 TABLET, FILM COATED ORAL at 20:23

## 2023-12-06 RX ADMIN — HYDROMORPHONE HYDROCHLORIDE 30 MILLILITER(S): 2 INJECTION INTRAMUSCULAR; INTRAVENOUS; SUBCUTANEOUS at 21:41

## 2023-12-06 RX ADMIN — Medication 975 MILLIGRAM(S): at 11:32

## 2023-12-06 RX ADMIN — Medication 100 MILLIGRAM(S): at 23:21

## 2023-12-06 RX ADMIN — SODIUM CHLORIDE 75 MILLILITER(S): 9 INJECTION, SOLUTION INTRAVENOUS at 20:17

## 2023-12-06 RX ADMIN — GABAPENTIN 100 MILLIGRAM(S): 400 CAPSULE ORAL at 23:21

## 2023-12-06 RX ADMIN — HEPARIN SODIUM 5000 UNIT(S): 5000 INJECTION INTRAVENOUS; SUBCUTANEOUS at 23:21

## 2023-12-06 RX ADMIN — SODIUM CHLORIDE 500 MILLILITER(S): 9 INJECTION INTRAMUSCULAR; INTRAVENOUS; SUBCUTANEOUS at 18:26

## 2023-12-06 RX ADMIN — HYDROMORPHONE HYDROCHLORIDE 30 MILLILITER(S): 2 INJECTION INTRAMUSCULAR; INTRAVENOUS; SUBCUTANEOUS at 17:00

## 2023-12-06 RX ADMIN — ATORVASTATIN CALCIUM 10 MILLIGRAM(S): 80 TABLET, FILM COATED ORAL at 23:21

## 2023-12-06 NOTE — CHART NOTE - NSCHARTNOTEFT_GEN_A_CORE
Patient s/p Flex Bronch, Uniportal Right Vats, RUL anterior segmentectomy, patient resting IV PCA in place.  Chest tube to water seal +AL noted.  Incision with dressing clean and dry.  Patient tolerating po, nolasco draining clear deangelo urine.    Vital Signs Last 24 Hrs  T(C): 36.5 (06 Dec 2023 19:00), Max: 37.3 (06 Dec 2023 10:17)  T(F): 97.7 (06 Dec 2023 19:00), Max: 99.2 (06 Dec 2023 10:17)  HR: 68 (06 Dec 2023 21:00) (65 - 97)  BP: 130/50 (06 Dec 2023 21:00) (99/56 - 130/50)  BP(mean): 71 (06 Dec 2023 21:00) (54 - 97)  RR: 16 (06 Dec 2023 21:00) (14 - 25)  SpO2: 100% (06 Dec 2023 21:00) (93% - 100%)    Parameters below as of 06 Dec 2023 19:30  Patient On (Oxygen Delivery Method): room air    I&O's Detail    06 Dec 2023 07:01  -  06 Dec 2023 22:09  --------------------------------------------------------  IN:    Lactated Ringers: 60 mL    Lactated Ringers: 75 mL    Oral Fluid: 440 mL    Sodium Chloride 0.9% Bolus: 250 mL  Total IN: 825 mL    OUT:    Chest Tube (mL): 5 mL    Indwelling Catheter - Urethral (mL): 120 mL  Total OUT: 125 mL    Total NET: 700 mL      MEDICATIONS  (STANDING):  atorvastatin 10 milliGRAM(s) Oral at bedtime  fluticasone propionate 50 MICROgram(s)/spray Nasal Spray 2 Spray(s) Both Nostrils every 12 hours  gabapentin 100 milliGRAM(s) Oral at bedtime  heparin   Injectable 5000 Unit(s) SubCutaneous every 8 hours  HYDROmorphone PCA (1 mG/mL) 30 milliLiter(s) PCA Continuous PCA Continuous  lactated ringers. 1000 milliLiter(s) (75 mL/Hr) IV Continuous <Continuous>  letrozole 2.5 milliGRAM(s) Oral daily  traZODone 100 milliGRAM(s) Oral at bedtime    A/P: S/P Flex Bronch, Uniportal Right Vats, RUL anterior segmentectomy   Continue chest tube to water seal   Follow up labs and CXR in am   Chest PT, ambulation and incentive spirometer   Continue IV PCA for pain management

## 2023-12-06 NOTE — ASU PREOP CHECKLIST - NOTHING BY MOUTH SINCE
PT SITTING IN BED HAVING LUNCH. WILL CONTINUE TO ROUND FREQUENTLY ON PT. BED IN LOW 
POSITION, CALL LIGHT WITHIN REACH. 05-Dec-2023 22:00

## 2023-12-06 NOTE — BRIEF OPERATIVE NOTE - NSICDXBRIEFPROCEDURE_GEN_ALL_CORE_FT
PROCEDURES:  Lung segmentectomy 06-Dec-2023 14:59:25 RUL anterior segment Ntaan Jackson   PROCEDURES:  Lung segmentectomy 06-Dec-2023 14:59:25 RUL anterior segment Natan Jackson

## 2023-12-07 LAB
ANION GAP SERPL CALC-SCNC: 11 MMOL/L — SIGNIFICANT CHANGE UP (ref 7–14)
ANION GAP SERPL CALC-SCNC: 11 MMOL/L — SIGNIFICANT CHANGE UP (ref 7–14)
BASOPHILS # BLD AUTO: 0.01 K/UL — SIGNIFICANT CHANGE UP (ref 0–0.2)
BASOPHILS # BLD AUTO: 0.01 K/UL — SIGNIFICANT CHANGE UP (ref 0–0.2)
BASOPHILS NFR BLD AUTO: 0.1 % — SIGNIFICANT CHANGE UP (ref 0–2)
BASOPHILS NFR BLD AUTO: 0.1 % — SIGNIFICANT CHANGE UP (ref 0–2)
BUN SERPL-MCNC: 17 MG/DL — SIGNIFICANT CHANGE UP (ref 7–23)
BUN SERPL-MCNC: 17 MG/DL — SIGNIFICANT CHANGE UP (ref 7–23)
CALCIUM SERPL-MCNC: 9 MG/DL — SIGNIFICANT CHANGE UP (ref 8.4–10.5)
CALCIUM SERPL-MCNC: 9 MG/DL — SIGNIFICANT CHANGE UP (ref 8.4–10.5)
CHLORIDE SERPL-SCNC: 102 MMOL/L — SIGNIFICANT CHANGE UP (ref 98–107)
CHLORIDE SERPL-SCNC: 102 MMOL/L — SIGNIFICANT CHANGE UP (ref 98–107)
CO2 SERPL-SCNC: 25 MMOL/L — SIGNIFICANT CHANGE UP (ref 22–31)
CO2 SERPL-SCNC: 25 MMOL/L — SIGNIFICANT CHANGE UP (ref 22–31)
CREAT SERPL-MCNC: 0.76 MG/DL — SIGNIFICANT CHANGE UP (ref 0.5–1.3)
CREAT SERPL-MCNC: 0.76 MG/DL — SIGNIFICANT CHANGE UP (ref 0.5–1.3)
EGFR: 79 ML/MIN/1.73M2 — SIGNIFICANT CHANGE UP
EGFR: 79 ML/MIN/1.73M2 — SIGNIFICANT CHANGE UP
EOSINOPHIL # BLD AUTO: 0.01 K/UL — SIGNIFICANT CHANGE UP (ref 0–0.5)
EOSINOPHIL # BLD AUTO: 0.01 K/UL — SIGNIFICANT CHANGE UP (ref 0–0.5)
EOSINOPHIL NFR BLD AUTO: 0.1 % — SIGNIFICANT CHANGE UP (ref 0–6)
EOSINOPHIL NFR BLD AUTO: 0.1 % — SIGNIFICANT CHANGE UP (ref 0–6)
GLUCOSE BLDC GLUCOMTR-MCNC: 146 MG/DL — HIGH (ref 70–99)
GLUCOSE BLDC GLUCOMTR-MCNC: 146 MG/DL — HIGH (ref 70–99)
GLUCOSE BLDC GLUCOMTR-MCNC: 99 MG/DL — SIGNIFICANT CHANGE UP (ref 70–99)
GLUCOSE BLDC GLUCOMTR-MCNC: 99 MG/DL — SIGNIFICANT CHANGE UP (ref 70–99)
GLUCOSE SERPL-MCNC: 138 MG/DL — HIGH (ref 70–99)
GLUCOSE SERPL-MCNC: 138 MG/DL — HIGH (ref 70–99)
HCT VFR BLD CALC: 33.8 % — LOW (ref 34.5–45)
HCT VFR BLD CALC: 33.8 % — LOW (ref 34.5–45)
HGB BLD-MCNC: 11.2 G/DL — LOW (ref 11.5–15.5)
HGB BLD-MCNC: 11.2 G/DL — LOW (ref 11.5–15.5)
IANC: 6.1 K/UL — SIGNIFICANT CHANGE UP (ref 1.8–7.4)
IANC: 6.1 K/UL — SIGNIFICANT CHANGE UP (ref 1.8–7.4)
IMM GRANULOCYTES NFR BLD AUTO: 1.1 % — HIGH (ref 0–0.9)
IMM GRANULOCYTES NFR BLD AUTO: 1.1 % — HIGH (ref 0–0.9)
LYMPHOCYTES # BLD AUTO: 0.76 K/UL — LOW (ref 1–3.3)
LYMPHOCYTES # BLD AUTO: 0.76 K/UL — LOW (ref 1–3.3)
LYMPHOCYTES # BLD AUTO: 10 % — LOW (ref 13–44)
LYMPHOCYTES # BLD AUTO: 10 % — LOW (ref 13–44)
MCHC RBC-ENTMCNC: 30.6 PG — SIGNIFICANT CHANGE UP (ref 27–34)
MCHC RBC-ENTMCNC: 30.6 PG — SIGNIFICANT CHANGE UP (ref 27–34)
MCHC RBC-ENTMCNC: 33.1 GM/DL — SIGNIFICANT CHANGE UP (ref 32–36)
MCHC RBC-ENTMCNC: 33.1 GM/DL — SIGNIFICANT CHANGE UP (ref 32–36)
MCV RBC AUTO: 92.3 FL — SIGNIFICANT CHANGE UP (ref 80–100)
MCV RBC AUTO: 92.3 FL — SIGNIFICANT CHANGE UP (ref 80–100)
MONOCYTES # BLD AUTO: 0.62 K/UL — SIGNIFICANT CHANGE UP (ref 0–0.9)
MONOCYTES # BLD AUTO: 0.62 K/UL — SIGNIFICANT CHANGE UP (ref 0–0.9)
MONOCYTES NFR BLD AUTO: 8.2 % — SIGNIFICANT CHANGE UP (ref 2–14)
MONOCYTES NFR BLD AUTO: 8.2 % — SIGNIFICANT CHANGE UP (ref 2–14)
NEUTROPHILS # BLD AUTO: 6.1 K/UL — SIGNIFICANT CHANGE UP (ref 1.8–7.4)
NEUTROPHILS # BLD AUTO: 6.1 K/UL — SIGNIFICANT CHANGE UP (ref 1.8–7.4)
NEUTROPHILS NFR BLD AUTO: 80.5 % — HIGH (ref 43–77)
NEUTROPHILS NFR BLD AUTO: 80.5 % — HIGH (ref 43–77)
NRBC # BLD: 0 /100 WBCS — SIGNIFICANT CHANGE UP (ref 0–0)
NRBC # BLD: 0 /100 WBCS — SIGNIFICANT CHANGE UP (ref 0–0)
NRBC # FLD: 0 K/UL — SIGNIFICANT CHANGE UP (ref 0–0)
NRBC # FLD: 0 K/UL — SIGNIFICANT CHANGE UP (ref 0–0)
PLATELET # BLD AUTO: 256 K/UL — SIGNIFICANT CHANGE UP (ref 150–400)
PLATELET # BLD AUTO: 256 K/UL — SIGNIFICANT CHANGE UP (ref 150–400)
POTASSIUM SERPL-MCNC: 4.6 MMOL/L — SIGNIFICANT CHANGE UP (ref 3.5–5.3)
POTASSIUM SERPL-MCNC: 4.6 MMOL/L — SIGNIFICANT CHANGE UP (ref 3.5–5.3)
POTASSIUM SERPL-SCNC: 4.6 MMOL/L — SIGNIFICANT CHANGE UP (ref 3.5–5.3)
POTASSIUM SERPL-SCNC: 4.6 MMOL/L — SIGNIFICANT CHANGE UP (ref 3.5–5.3)
RBC # BLD: 3.66 M/UL — LOW (ref 3.8–5.2)
RBC # BLD: 3.66 M/UL — LOW (ref 3.8–5.2)
RBC # FLD: 11.9 % — SIGNIFICANT CHANGE UP (ref 10.3–14.5)
RBC # FLD: 11.9 % — SIGNIFICANT CHANGE UP (ref 10.3–14.5)
SODIUM SERPL-SCNC: 138 MMOL/L — SIGNIFICANT CHANGE UP (ref 135–145)
SODIUM SERPL-SCNC: 138 MMOL/L — SIGNIFICANT CHANGE UP (ref 135–145)
WBC # BLD: 7.58 K/UL — SIGNIFICANT CHANGE UP (ref 3.8–10.5)
WBC # BLD: 7.58 K/UL — SIGNIFICANT CHANGE UP (ref 3.8–10.5)
WBC # FLD AUTO: 7.58 K/UL — SIGNIFICANT CHANGE UP (ref 3.8–10.5)
WBC # FLD AUTO: 7.58 K/UL — SIGNIFICANT CHANGE UP (ref 3.8–10.5)

## 2023-12-07 PROCEDURE — 71045 X-RAY EXAM CHEST 1 VIEW: CPT | Mod: 26

## 2023-12-07 RX ORDER — ACETAMINOPHEN 500 MG
650 TABLET ORAL EVERY 6 HOURS
Refills: 0 | Status: COMPLETED | OUTPATIENT
Start: 2023-12-07 | End: 2023-12-09

## 2023-12-07 RX ORDER — LIDOCAINE 4 G/100G
1 CREAM TOPICAL EVERY 24 HOURS
Refills: 0 | Status: DISCONTINUED | OUTPATIENT
Start: 2023-12-07 | End: 2023-12-09

## 2023-12-07 RX ORDER — TRAMADOL HYDROCHLORIDE 50 MG/1
25 TABLET ORAL EVERY 4 HOURS
Refills: 0 | Status: DISCONTINUED | OUTPATIENT
Start: 2023-12-07 | End: 2023-12-09

## 2023-12-07 RX ADMIN — LETROZOLE 2.5 MILLIGRAM(S): 2.5 TABLET, FILM COATED ORAL at 12:20

## 2023-12-07 RX ADMIN — ATORVASTATIN CALCIUM 10 MILLIGRAM(S): 80 TABLET, FILM COATED ORAL at 21:12

## 2023-12-07 RX ADMIN — TRAMADOL HYDROCHLORIDE 25 MILLIGRAM(S): 50 TABLET ORAL at 21:11

## 2023-12-07 RX ADMIN — HEPARIN SODIUM 5000 UNIT(S): 5000 INJECTION INTRAVENOUS; SUBCUTANEOUS at 05:18

## 2023-12-07 RX ADMIN — HYDROMORPHONE HYDROCHLORIDE 30 MILLILITER(S): 2 INJECTION INTRAMUSCULAR; INTRAVENOUS; SUBCUTANEOUS at 07:18

## 2023-12-07 RX ADMIN — Medication 100 MILLIGRAM(S): at 21:12

## 2023-12-07 RX ADMIN — TRAMADOL HYDROCHLORIDE 25 MILLIGRAM(S): 50 TABLET ORAL at 10:59

## 2023-12-07 RX ADMIN — HEPARIN SODIUM 5000 UNIT(S): 5000 INJECTION INTRAVENOUS; SUBCUTANEOUS at 21:12

## 2023-12-07 RX ADMIN — TRAMADOL HYDROCHLORIDE 25 MILLIGRAM(S): 50 TABLET ORAL at 11:30

## 2023-12-07 RX ADMIN — HEPARIN SODIUM 5000 UNIT(S): 5000 INJECTION INTRAVENOUS; SUBCUTANEOUS at 13:52

## 2023-12-07 RX ADMIN — TRAMADOL HYDROCHLORIDE 25 MILLIGRAM(S): 50 TABLET ORAL at 15:00

## 2023-12-07 RX ADMIN — Medication 81 MILLIGRAM(S): at 12:19

## 2023-12-07 NOTE — PATIENT PROFILE ADULT - FALL HARM RISK - UNIVERSAL INTERVENTIONS
Bed in lowest position, wheels locked, appropriate side rails in place/Call bell, personal items and telephone in reach/Instruct patient to call for assistance before getting out of bed or chair/Non-slip footwear when patient is out of bed/[place] to call system/Physically safe environment - no spills, clutter or unnecessary equipment/Purposeful Proactive Rounding/Room/bathroom lighting operational, light cord in reach Bed in lowest position, wheels locked, appropriate side rails in place/Call bell, personal items and telephone in reach/Instruct patient to call for assistance before getting out of bed or chair/Non-slip footwear when patient is out of bed/[place] to call system/Physically safe environment - no spills, clutter or unnecessary equipment/Purposeful Proactive Rounding/Room/bathroom lighting operational, light cord in reach

## 2023-12-07 NOTE — PROGRESS NOTE ADULT - SUBJECTIVE AND OBJECTIVE BOX
Subjective: patient seen and examined with thoracic surgery team and Dr Branch  pt without acute complaints  pain controlled  pt denies chest pain or shortness of breath  using incentive spirometer  on bowel regimen, +flatus, +BM  ambulatory with assistance  nolasco removed, pt due to void  d/w attending on morning TEAMS report      Vital Signs:  Vital Signs Last 24 Hrs  T(C): 36.4 (12-07-23 @ 12:40), Max: 37.2 (12-06-23 @ 22:30)  T(F): 97.5 (12-07-23 @ 12:40), Max: 99 (12-06-23 @ 22:30)  HR: 84 (12-07-23 @ 12:40) (65 - 93)  BP: 141/62 (12-07-23 @ 12:40) (99/56 - 141/62)  RR: 16 (12-07-23 @ 12:40) (14 - 25)  SpO2: 95% (12-07-23 @ 12:40) (93% - 100%) on (O2)      PE   Telemetry/Alarms: SR  General: awake and alert NAD  Neurology: A&Ox3, nonfocal, CHERRY x 4  Respiratory: CTA B/L  CV: RRR, S1S2, no murmurs, rubs or gallops  Abdominal: Soft, NT, ND +BS, Last BM  Extremities: No edema, + peripheral pulses  Incisions: c,d,i  Tubes: R chest tube waterseal +FEAL  Relevant labs, radiology and Medications reviewed                        11.2   7.58  )-----------( 256      ( 07 Dec 2023 05:55 )             33.8     12-07    138  |  102  |  17  ----------------------------<  138<H>  4.6   |  25  |  0.76    Ca    9.0      07 Dec 2023 05:55        MEDICATIONS  (STANDING):  acetaminophen     Tablet .. 650 milliGRAM(s) Oral every 6 hours  aspirin enteric coated 81 milliGRAM(s) Oral daily  atorvastatin 10 milliGRAM(s) Oral at bedtime  fluticasone propionate 50 MICROgram(s)/spray Nasal Spray 2 Spray(s) Both Nostrils every 12 hours  gabapentin 100 milliGRAM(s) Oral at bedtime  heparin   Injectable 5000 Unit(s) SubCutaneous every 8 hours  letrozole 2.5 milliGRAM(s) Oral daily  lidocaine   4% Patch 1 Patch Transdermal every 24 hours  traZODone 100 milliGRAM(s) Oral at bedtime    MEDICATIONS  (PRN):  naloxone Injectable 0.1 milliGRAM(s) IV Push every 3 minutes PRN For ANY of the following changes in patient status:  A. RR LESS THAN 10 breaths per minute, B. Oxygen saturation LESS THAN 90%, C. Sedation score of 6  ondansetron Injectable 4 milliGRAM(s) IV Push every 6 hours PRN Nausea  traMADol 25 milliGRAM(s) Oral every 4 hours PRN Moderate to Severe Pain (4 - 10)    Pertinent Physical Exam  I&O's Summary    06 Dec 2023 07:01  -  07 Dec 2023 07:00  --------------------------------------------------------  IN: 1380 mL / OUT: 550 mL / NET: 830 mL          PAST MEDICAL & SURGICAL HISTORY:  Lal's esophagus      GERD (gastroesophageal reflux disease)      Hyperlipidemia      IBS (irritable bowel syndrome)      Anxiety      Hiatal hernia      Solitary pulmonary nodule      Parotid gland enlargement  h/o biopsy 4 years ago and MRI 2015 with no changes      Right leg DVT      Adenocarcinoma      Malignant lung neoplasm      Insomnia      Breast cancer, left      S/P radiation therapy      Emphysema lung      H/O endoscopy  April 2015      S/P cholecystectomy  25 years ago      S/P excision of lipoma  8/2015      Status post lung surgery      S/P lumpectomy, left breast      S/P total knee replacement, right      Cataract        ASSESSMENT  79y/o female states, "hx of left Vats, LLLobectomy 2016 dx with adenocarcinoma, left breast cancer s/p lumpectomy radiation 2020,  right lung mass for 2 yrs, recent imaging shows increase in size." Patient s/p Flex bronch, Uniportal Right Vats, RUL anterior segmentectomy on 12/6/2023. Postop chest tube remained for +FEAL. Nolasco removed, pt due to void.    PLAN  Neuro: Pain management  Pulm: Encourage coughing, deep breathing and use of incentive spirometry. Wean off supplemental oxygen as able. Daily CXR.   Cardio: Monitor telemetry/alarms  GI: Tolerating diet. Continue stool softeners.  Renal: monitor urine output, supplement electrolytes as needed  Vasc: Heparin SC/SCDs for DVT prophylaxis  Heme: Stable H/H. .   ID: Off antibiotics. Stable.  Therapy: OOB/ambulate  Tubes: Monitor Chest tube output and air leak status  Disposition: Aim to D/C to home once air leak resolves and chest tube can be removed  Discussed with Cardiothoracic Team at AM rounds.      Subjective: patient seen and examined with thoracic surgery team and Dr Branch  pt without acute complaints  pain controlled  pt denies chest pain or shortness of breath  using incentive spirometer  on bowel regimen, +flatus, +BM  ambulatory with assistance  nolasco removed, pt due to void  d/w attending on morning TEAMS report      Vital Signs:  Vital Signs Last 24 Hrs  T(C): 36.4 (12-07-23 @ 12:40), Max: 37.2 (12-06-23 @ 22:30)  T(F): 97.5 (12-07-23 @ 12:40), Max: 99 (12-06-23 @ 22:30)  HR: 84 (12-07-23 @ 12:40) (65 - 93)  BP: 141/62 (12-07-23 @ 12:40) (99/56 - 141/62)  RR: 16 (12-07-23 @ 12:40) (14 - 25)  SpO2: 95% (12-07-23 @ 12:40) (93% - 100%) on (O2)      PE   Telemetry/Alarms: SR  General: awake and alert NAD  Neurology: A&Ox3, nonfocal, CHERRY x 4  Respiratory: CTA B/L  CV: RRR, S1S2, no murmurs, rubs or gallops  Abdominal: Soft, NT, ND +BS, Last BM  Extremities: No edema, + peripheral pulses  Incisions: c,d,i  Tubes: R chest tube waterseal +FEAL  Relevant labs, radiology and Medications reviewed                        11.2   7.58  )-----------( 256      ( 07 Dec 2023 05:55 )             33.8     12-07    138  |  102  |  17  ----------------------------<  138<H>  4.6   |  25  |  0.76    Ca    9.0      07 Dec 2023 05:55        MEDICATIONS  (STANDING):  acetaminophen     Tablet .. 650 milliGRAM(s) Oral every 6 hours  aspirin enteric coated 81 milliGRAM(s) Oral daily  atorvastatin 10 milliGRAM(s) Oral at bedtime  fluticasone propionate 50 MICROgram(s)/spray Nasal Spray 2 Spray(s) Both Nostrils every 12 hours  gabapentin 100 milliGRAM(s) Oral at bedtime  heparin   Injectable 5000 Unit(s) SubCutaneous every 8 hours  letrozole 2.5 milliGRAM(s) Oral daily  lidocaine   4% Patch 1 Patch Transdermal every 24 hours  traZODone 100 milliGRAM(s) Oral at bedtime    MEDICATIONS  (PRN):  naloxone Injectable 0.1 milliGRAM(s) IV Push every 3 minutes PRN For ANY of the following changes in patient status:  A. RR LESS THAN 10 breaths per minute, B. Oxygen saturation LESS THAN 90%, C. Sedation score of 6  ondansetron Injectable 4 milliGRAM(s) IV Push every 6 hours PRN Nausea  traMADol 25 milliGRAM(s) Oral every 4 hours PRN Moderate to Severe Pain (4 - 10)    Pertinent Physical Exam  I&O's Summary    06 Dec 2023 07:01  -  07 Dec 2023 07:00  --------------------------------------------------------  IN: 1380 mL / OUT: 550 mL / NET: 830 mL          PAST MEDICAL & SURGICAL HISTORY:  Lal's esophagus      GERD (gastroesophageal reflux disease)      Hyperlipidemia      IBS (irritable bowel syndrome)      Anxiety      Hiatal hernia      Solitary pulmonary nodule      Parotid gland enlargement  h/o biopsy 4 years ago and MRI 2015 with no changes      Right leg DVT      Adenocarcinoma      Malignant lung neoplasm      Insomnia      Breast cancer, left      S/P radiation therapy      Emphysema lung      H/O endoscopy  April 2015      S/P cholecystectomy  25 years ago      S/P excision of lipoma  8/2015      Status post lung surgery      S/P lumpectomy, left breast      S/P total knee replacement, right      Cataract        ASSESSMENT  77y/o female states, "hx of left Vats, LLLobectomy 2016 dx with adenocarcinoma, left breast cancer s/p lumpectomy radiation 2020,  right lung mass for 2 yrs, recent imaging shows increase in size." Patient s/p Flex bronch, Uniportal Right Vats, RUL anterior segmentectomy on 12/6/2023. Postop chest tube remained for +FEAL. Nloasco removed, pt due to void.    PLAN  Neuro: Pain management  Pulm: Encourage coughing, deep breathing and use of incentive spirometry. Wean off supplemental oxygen as able. Daily CXR.   Cardio: Monitor telemetry/alarms  GI: Tolerating diet. Continue stool softeners.  Renal: monitor urine output, supplement electrolytes as needed  Vasc: Heparin SC/SCDs for DVT prophylaxis  Heme: Stable H/H. .   ID: Off antibiotics. Stable.  Therapy: OOB/ambulate  Tubes: Monitor Chest tube output and air leak status  Disposition: Aim to D/C to home once air leak resolves and chest tube can be removed  Discussed with Cardiothoracic Team at AM rounds.

## 2023-12-07 NOTE — DISCHARGE NOTE PROVIDER - NSDCCPCAREPLAN_GEN_ALL_CORE_FT
PRINCIPAL DISCHARGE DIAGNOSIS  Diagnosis: Malignant neoplasm of lung  Assessment and Plan of Treatment:

## 2023-12-07 NOTE — DISCHARGE NOTE PROVIDER - CARE PROVIDER_API CALL
Lui Branch  Thoracic Surgery  [address] [address] [place] [address], [address] [place] [address]  Phone: [phone]  Fax: [phone]  Follow Up Time:    Lui Branch  Thoracic Surgery  [address] [place] [address], [address] [place] [address]  Phone: [phone]  Fax: [phone]  Follow Up Time:

## 2023-12-07 NOTE — DISCHARGE NOTE PROVIDER - HOSPITAL COURSE
79y/o female states, "hx of left Vats, LLLobectomy 2016 dx with adenocarcinoma, left breast cancer s/p lumpectomy radiation 2020,  right lung mass for 2 yrs, recent imaging shows increase in size." Patient s/p Flex bronch, Uniportal Right Vats, RUL anterior segmentectomy on 12/6/23.  Post op course without complication, patient stable for discharge home when chest tube removed.       77y/o female states, "hx of left Vats, LLLobectomy 2016 dx with adenocarcinoma, left breast cancer s/p lumpectomy radiation 2020,  right lung mass for 2 yrs, recent imaging shows increase in size." Patient s/p Flex bronch, Uniportal Right Vats, RUL anterior segmentectomy on 12/6/23.  Post op course without complication, patient stable for discharge home when chest tube removed.       77y/o female states, "hx of left Vats, LLLobectomy 2016 dx with adenocarcinoma, left breast cancer s/p lumpectomy radiation 2020,  right lung mass for 2 yrs, recent imaging shows increase in size." Patient s/p Flex bronch, Uniportal Right Vats, RUL anterior segmentectomy on 12/6/23.  Post op course chest tube with air leak, patient stable for discharge home when air leak resolves and chest tube removed.       77y/o female PMH of breast cancer, lung adenocarcinoma s/p LLL lobectomy in 206, presents with right upper lobe nodule now s/p flexible bronchoscopy, uniportal RVATS, RUL anterior segmentectomy on 12/6/2023 with Dr. Branch. Post-operative course significant for air leak that resolved. On the day of discharge, patient was ambulating, tolerating PO, electrolytes repleted as necessary, performing ADLs as necessary, stable for discharge with appropriate outpatient care and follow-up. Discharge approved by Dr. Bae.          79y/o female PMH of breast cancer, lung adenocarcinoma s/p LLL lobectomy in 206, presents with right upper lobe nodule now s/p flexible bronchoscopy, uniportal RVATS, RUL anterior segmentectomy on 12/6/2023 with Dr. Branch. Post-operative course significant for air leak that resolved. Today, no air leak seen in CT.  CT removed at bedside.F/u CXR done. Reviewed with Radiology, compared to morning CXR, no significant change. Pt feels well.   VATS c/d/i. Pt c/o ongoing chronic cough x 1hr. On RA. On the day of discharge, patient was ambulating, tolerating PO, electrolytes repleted as necessary, performing ADLs as necessary, stable for discharge with appropriate outpatient care and follow-up. Discharge approved by Dr. Bae.   Vital Signs Last 24 Hrs  T(C): 37.1 (09 Dec 2023 08:39), Max: 37.1 (08 Dec 2023 20:21)  T(F): 98.7 (09 Dec 2023 08:39), Max: 98.7 (08 Dec 2023 20:21)  HR: 96 (09 Dec 2023 08:39) (83 - 96)  BP: 148/64 (09 Dec 2023 08:39) (148/64 - 149/67)  BP(mean): --  RR: 18 (09 Dec 2023 08:39) (18 - 18)  SpO2: 93% (09 Dec 2023 08:39) (93% - 98%)    Parameters below as of 09 Dec 2023 08:39  Patient On (Oxygen Delivery Method): room air             77y/o female PMH of breast cancer, lung adenocarcinoma s/p LLL lobectomy in 206, presents with right upper lobe nodule now s/p flexible bronchoscopy, uniportal RVATS, RUL anterior segmentectomy on 12/6/2023 with Dr. Branch. Post-operative course significant for air leak that resolved. Today, no air leak seen in CT.  CT removed at bedside.F/u CXR done. Reviewed with Radiology, compared to morning CXR, no significant change. Pt feels well.   VATS c/d/i. Pt c/o ongoing chronic cough x 1hr. On RA. On the day of discharge, patient was ambulating, tolerating PO, electrolytes repleted as necessary, performing ADLs as necessary, stable for discharge with appropriate outpatient care and follow-up. Discharge approved by Dr. Bae.   Vital Signs Last 24 Hrs  T(C): 37.1 (09 Dec 2023 08:39), Max: 37.1 (08 Dec 2023 20:21)  T(F): 98.7 (09 Dec 2023 08:39), Max: 98.7 (08 Dec 2023 20:21)  HR: 96 (09 Dec 2023 08:39) (83 - 96)  BP: 148/64 (09 Dec 2023 08:39) (148/64 - 149/67)  BP(mean): --  RR: 18 (09 Dec 2023 08:39) (18 - 18)  SpO2: 93% (09 Dec 2023 08:39) (93% - 98%)    Parameters below as of 09 Dec 2023 08:39  Patient On (Oxygen Delivery Method): room air

## 2023-12-07 NOTE — DISCHARGE NOTE PROVIDER - NSDCCPTREATMENT_GEN_ALL_CORE_FT
PRINCIPAL PROCEDURE  Procedure: Lung segmentectomy  Findings and Treatment: RUL anterior segment

## 2023-12-07 NOTE — DISCHARGE NOTE PROVIDER - NSDCMRMEDTOKEN_GEN_ALL_CORE_FT
amLODIPine 5 mg oral tablet: 1 tab(s) orally once a day  aspirin 81 mg oral tablet: 1 tab(s) orally once a day  atorvastatin 10 mg oral tablet: 1 tab(s) orally once a day (at bedtime)  AZO - cranberry vitamin: 1 tab oral once a day  calcium: 1 tab oral once a day  Flonase 50 mcg/inh nasal spray: 1 spray(s) in each nostril once a day  gabapentin 100 mg oral capsule: 1 cap(s) orally once a day (at bedtime)  letrozole 2.5 mg oral tablet: 1 tab(s) orally once a day  multivitamin: 1 tab oral once a day  traZODone 100 mg oral tablet: 1 tab(s) orally once a day (at bedtime)  Xanax 0.5 mg oral tablet: 0.5 tab(s) orally once a day (at bedtime) as needed for  insomnia   amLODIPine 5 mg oral tablet: 1 tab(s) orally once a day  aspirin 81 mg oral tablet: 1 tab(s) orally once a day  atorvastatin 10 mg oral tablet: 1 tab(s) orally once a day (at bedtime)  AZO - cranberry vitamin: 1 tab oral once a day  calcium: 1 tab oral once a day  Flonase 50 mcg/inh nasal spray: 1 spray(s) in each nostril once a day  gabapentin 100 mg oral capsule: 1 cap(s) orally once a day (at bedtime)  letrozole 2.5 mg oral tablet: 1 tab(s) orally once a day  multivitamin: 1 tab oral once a day  traMADol 50 mg oral tablet: 1 tab(s) orally every 6 hours as needed for  severe pain MDD: 4 tabs  traZODone 100 mg oral tablet: 1 tab(s) orally once a day (at bedtime)  Xanax 0.5 mg oral tablet: 0.5 tab(s) orally once a day (at bedtime) as needed for  insomnia   amLODIPine 5 mg oral tablet: 1 tab(s) orally once a day  aspirin 81 mg oral tablet: 1 tab(s) orally once a day  atorvastatin 10 mg oral tablet: 1 tab(s) orally once a day (at bedtime)  AZO - cranberry vitamin: 1 tab oral once a day  calcium: 1 tab oral once a day  Flonase 50 mcg/inh nasal spray: 1 spray(s) in each nostril once a day  gabapentin 100 mg oral capsule: 1 cap(s) orally once a day (at bedtime)  letrozole 2.5 mg oral tablet: 1 tab(s) orally once a day  MiraLax oral powder for reconstitution: 17 gram(s) orally once a day  multivitamin: 1 tab oral once a day  senna (sennosides) 15 mg oral tablet, chewable: 2 tab(s) chewed once a day (at bedtime) as needed for  constipation  traMADol 50 mg oral tablet: 1 tab(s) orally every 6 hours as needed for  severe pain MDD: 4 tabs  traZODone 100 mg oral tablet: 1 tab(s) orally once a day (at bedtime)  Tylenol 325 mg oral tablet: 2 tab(s) orally every 6 hours as needed for mild to moderate pain  Xanax 0.5 mg oral tablet: 0.5 tab(s) orally once a day (at bedtime) as needed for  insomnia

## 2023-12-07 NOTE — DISCHARGE NOTE PROVIDER - CARE PROVIDERS DIRECT ADDRESSES
,[email or web address]

## 2023-12-07 NOTE — DISCHARGE NOTE PROVIDER - NSDCFUADDAPPT_GEN_ALL_CORE_FT
Follow up with Dr. Branch in 2 weeks   Chest x-ray prior to appointment with Dr. Branch.    Follow up with primary care provider in one week  Follow up with Dr. Branch in 2 weeks Call for your apt. [phone]  Chest x-ray prior to appointment with Dr. Branch.    Follow up with primary care provider in one week  Follow up with Dr. Branch in 2 weeks Call for your apt. [phone]  Chest x-ray prior to appointment with Dr. Branch.    Follow up with primary care provider in one week

## 2023-12-07 NOTE — DISCHARGE NOTE PROVIDER - NSDCFUADDINST_GEN_ALL_CORE_FT
Please walk 4-5 x per day; increase as tolerated. You may climb stairs. Continue to use the incentive spirometer.   You may keep wounds uncovered. Please shower daily with soap and water. The suture will be removed in the office at the follow up appointment.   Please call the office at [phone] if you have fevers, chills, worsening shortness of breath, chest pain, warmth, redness or purulent discharge from the wound.  Please walk 4-5 x per day; increase as tolerated. You may climb stairs. Continue to use the incentive spirometer.   You may keep wounds uncovered. Please shower daily with soap and water. The suture will be removed in the office at the follow up appointment.   Please call the office at [phone] if you have fevers, chills, worsening shortness of breath, chest pain, warmth, redness or purulent discharge from the wound.  Please walk 4-5 x per day; increase as tolerated. You may climb stairs. Continue to use the incentive spirometer.   You may remove chest tube site dressing on Monday then begin to shower. All wounds can stay uncovered at that point.  Please shower daily with soap and water. The suture will be removed in the office at the follow up appointment.   Please call the office at [phone] if you have fevers, chills, worsening shortness of breath, chest pain, warmth, redness or purulent discharge from the wound.  Please walk 4-5 x per day; increase as tolerated. You may climb stairs. Continue to use the incentive spirometer.   You may remove chest tube site dressing on Monday then begin to shower. All wounds can stay uncovered at that point.  Please shower daily with soap and water. The suture will be removed in the office at the follow up appointment.   Please call the office at [phone] if you have fevers, chills, worsening shortness of breath, chest pain, warmth, redness or purulent discharge from the wound.

## 2023-12-07 NOTE — PROGRESS NOTE ADULT - SUBJECTIVE AND OBJECTIVE BOX
Anesthesia Pain Management Service    SUBJECTIVE: Patient was complaining that the IV PCA made her nauseous, so she stopped using it. Patient states from her previous surgery, she was able to take Tramadol without any issue.     Pain Scale Score	At rest: _3/10__ 	With Activity: ___ 	[X ] Refer to charted pain scores    THERAPY:    [ ] IV PCA Morphine		[ ] 5 mg/mL	[ ] 1 mg/mL  [X ] IV PCA Hydromorphone	[ ] 5 mg/mL	[X ] 1 mg/mL  [ ] IV PCA Fentanyl		[ ] 50 micrograms/mL    Demand dose __0.2_ lockout __6_ (minutes) Continuous Rate _0__ Total: _1__   mg used (in past 24 hrs)      MEDICATIONS  (STANDING):  acetaminophen     Tablet .. 650 milliGRAM(s) Oral every 6 hours  aspirin enteric coated 81 milliGRAM(s) Oral daily  atorvastatin 10 milliGRAM(s) Oral at bedtime  fluticasone propionate 50 MICROgram(s)/spray Nasal Spray 2 Spray(s) Both Nostrils every 12 hours  gabapentin 100 milliGRAM(s) Oral at bedtime  heparin   Injectable 5000 Unit(s) SubCutaneous every 8 hours  letrozole 2.5 milliGRAM(s) Oral daily  lidocaine   4% Patch 1 Patch Transdermal every 24 hours  traZODone 100 milliGRAM(s) Oral at bedtime    MEDICATIONS  (PRN):  naloxone Injectable 0.1 milliGRAM(s) IV Push every 3 minutes PRN For ANY of the following changes in patient status:  A. RR LESS THAN 10 breaths per minute, B. Oxygen saturation LESS THAN 90%, C. Sedation score of 6  ondansetron Injectable 4 milliGRAM(s) IV Push every 6 hours PRN Nausea  traMADol 25 milliGRAM(s) Oral every 4 hours PRN Moderate to Severe Pain (4 - 10)      OBJECTIVE:  Patient is sitting up in chair.    Sedation Score:	[ X] Alert	[ ] Drowsy 	[ ] Arousable	[ ] Asleep	[ ] Unresponsive    Side Effects:	[ ] None	[x ] Nausea	[ ] Vomiting	[ ] Pruritus  		[ ] Other:    Vital Signs Last 24 Hrs  T(C): 36.4 (07 Dec 2023 12:40), Max: 37.2 (06 Dec 2023 22:30)  T(F): 97.5 (07 Dec 2023 12:40), Max: 99 (06 Dec 2023 22:30)  HR: 84 (07 Dec 2023 12:40) (65 - 93)  BP: 141/62 (07 Dec 2023 12:40) (99/56 - 141/62)  BP(mean): 71 (06 Dec 2023 21:00) (54 - 97)  RR: 16 (07 Dec 2023 12:40) (14 - 25)  SpO2: 95% (07 Dec 2023 12:40) (93% - 100%)    Parameters below as of 07 Dec 2023 12:40  Patient On (Oxygen Delivery Method): room air        ASSESSMENT/ PLAN    Therapy to  be:	[ ] Continue   [ X] Discontinued   [X ] Change to prn Analgesics    Documentation and Verification of current medications:   [X] Done	[ ] Not done, not elligible    Comments: Patient previously had an epidural intraop, however it was discontinued and IV Dilaudid PCA ordered instead. Due to patient's adverse reaction to the IV Dilaudid PCA, it will be discontinued.  PRN Tramadol and/or Adjuvant non-opioid medication to be ordered at this point.    Progress Note written now but Patient was seen earlier.

## 2023-12-07 NOTE — PROGRESS NOTE ADULT - SUBJECTIVE AND OBJECTIVE BOX
ANESTHESIA POSTOP CHECK    78y Female POSTOP DAY 1 S/P   [x ] General Anesthesia  [ ] Sánchez Anesthesia  [ ] MAC    Vital Signs Last 24 Hrs  T(C): 36.6 (07 Dec 2023 08:46), Max: 37.3 (06 Dec 2023 10:17)  T(F): 97.9 (07 Dec 2023 08:46), Max: 99.2 (06 Dec 2023 10:17)  HR: 84 (07 Dec 2023 08:46) (65 - 97)  BP: 125/45 (07 Dec 2023 08:46) (99/56 - 135/51)  BP(mean): 71 (06 Dec 2023 21:00) (54 - 97)  RR: 16 (07 Dec 2023 08:46) (14 - 25)  SpO2: 94% (07 Dec 2023 08:46) (93% - 100%)    Parameters below as of 07 Dec 2023 08:46  Patient On (Oxygen Delivery Method): room air      I&O's Summary    06 Dec 2023 07:01  -  07 Dec 2023 07:00  --------------------------------------------------------  IN: 1380 mL / OUT: 550 mL / NET: 830 mL        [x ] NO APPARENT ANESTHESIA COMPLICATIONS      Comments:

## 2023-12-08 LAB
ANION GAP SERPL CALC-SCNC: 10 MMOL/L — SIGNIFICANT CHANGE UP (ref 7–14)
ANION GAP SERPL CALC-SCNC: 10 MMOL/L — SIGNIFICANT CHANGE UP (ref 7–14)
BUN SERPL-MCNC: 15 MG/DL — SIGNIFICANT CHANGE UP (ref 7–23)
BUN SERPL-MCNC: 15 MG/DL — SIGNIFICANT CHANGE UP (ref 7–23)
CALCIUM SERPL-MCNC: 8.7 MG/DL — SIGNIFICANT CHANGE UP (ref 8.4–10.5)
CALCIUM SERPL-MCNC: 8.7 MG/DL — SIGNIFICANT CHANGE UP (ref 8.4–10.5)
CHLORIDE SERPL-SCNC: 99 MMOL/L — SIGNIFICANT CHANGE UP (ref 98–107)
CHLORIDE SERPL-SCNC: 99 MMOL/L — SIGNIFICANT CHANGE UP (ref 98–107)
CO2 SERPL-SCNC: 28 MMOL/L — SIGNIFICANT CHANGE UP (ref 22–31)
CO2 SERPL-SCNC: 28 MMOL/L — SIGNIFICANT CHANGE UP (ref 22–31)
CREAT SERPL-MCNC: 0.79 MG/DL — SIGNIFICANT CHANGE UP (ref 0.5–1.3)
CREAT SERPL-MCNC: 0.79 MG/DL — SIGNIFICANT CHANGE UP (ref 0.5–1.3)
EGFR: 76 ML/MIN/1.73M2 — SIGNIFICANT CHANGE UP
EGFR: 76 ML/MIN/1.73M2 — SIGNIFICANT CHANGE UP
GLUCOSE SERPL-MCNC: 99 MG/DL — SIGNIFICANT CHANGE UP (ref 70–99)
GLUCOSE SERPL-MCNC: 99 MG/DL — SIGNIFICANT CHANGE UP (ref 70–99)
HCT VFR BLD CALC: 33.9 % — LOW (ref 34.5–45)
HCT VFR BLD CALC: 33.9 % — LOW (ref 34.5–45)
HGB BLD-MCNC: 11.2 G/DL — LOW (ref 11.5–15.5)
HGB BLD-MCNC: 11.2 G/DL — LOW (ref 11.5–15.5)
MCHC RBC-ENTMCNC: 30.9 PG — SIGNIFICANT CHANGE UP (ref 27–34)
MCHC RBC-ENTMCNC: 30.9 PG — SIGNIFICANT CHANGE UP (ref 27–34)
MCHC RBC-ENTMCNC: 33 GM/DL — SIGNIFICANT CHANGE UP (ref 32–36)
MCHC RBC-ENTMCNC: 33 GM/DL — SIGNIFICANT CHANGE UP (ref 32–36)
MCV RBC AUTO: 93.4 FL — SIGNIFICANT CHANGE UP (ref 80–100)
MCV RBC AUTO: 93.4 FL — SIGNIFICANT CHANGE UP (ref 80–100)
NRBC # BLD: 0 /100 WBCS — SIGNIFICANT CHANGE UP (ref 0–0)
NRBC # BLD: 0 /100 WBCS — SIGNIFICANT CHANGE UP (ref 0–0)
NRBC # FLD: 0 K/UL — SIGNIFICANT CHANGE UP (ref 0–0)
NRBC # FLD: 0 K/UL — SIGNIFICANT CHANGE UP (ref 0–0)
PLATELET # BLD AUTO: 261 K/UL — SIGNIFICANT CHANGE UP (ref 150–400)
PLATELET # BLD AUTO: 261 K/UL — SIGNIFICANT CHANGE UP (ref 150–400)
POTASSIUM SERPL-MCNC: 4.2 MMOL/L — SIGNIFICANT CHANGE UP (ref 3.5–5.3)
POTASSIUM SERPL-MCNC: 4.2 MMOL/L — SIGNIFICANT CHANGE UP (ref 3.5–5.3)
POTASSIUM SERPL-SCNC: 4.2 MMOL/L — SIGNIFICANT CHANGE UP (ref 3.5–5.3)
POTASSIUM SERPL-SCNC: 4.2 MMOL/L — SIGNIFICANT CHANGE UP (ref 3.5–5.3)
RBC # BLD: 3.63 M/UL — LOW (ref 3.8–5.2)
RBC # BLD: 3.63 M/UL — LOW (ref 3.8–5.2)
RBC # FLD: 11.9 % — SIGNIFICANT CHANGE UP (ref 10.3–14.5)
RBC # FLD: 11.9 % — SIGNIFICANT CHANGE UP (ref 10.3–14.5)
SODIUM SERPL-SCNC: 137 MMOL/L — SIGNIFICANT CHANGE UP (ref 135–145)
SODIUM SERPL-SCNC: 137 MMOL/L — SIGNIFICANT CHANGE UP (ref 135–145)
WBC # BLD: 9.05 K/UL — SIGNIFICANT CHANGE UP (ref 3.8–10.5)
WBC # BLD: 9.05 K/UL — SIGNIFICANT CHANGE UP (ref 3.8–10.5)
WBC # FLD AUTO: 9.05 K/UL — SIGNIFICANT CHANGE UP (ref 3.8–10.5)
WBC # FLD AUTO: 9.05 K/UL — SIGNIFICANT CHANGE UP (ref 3.8–10.5)

## 2023-12-08 PROCEDURE — 71045 X-RAY EXAM CHEST 1 VIEW: CPT | Mod: 26

## 2023-12-08 RX ADMIN — HEPARIN SODIUM 5000 UNIT(S): 5000 INJECTION INTRAVENOUS; SUBCUTANEOUS at 05:04

## 2023-12-08 RX ADMIN — TRAMADOL HYDROCHLORIDE 25 MILLIGRAM(S): 50 TABLET ORAL at 15:30

## 2023-12-08 RX ADMIN — TRAMADOL HYDROCHLORIDE 25 MILLIGRAM(S): 50 TABLET ORAL at 05:08

## 2023-12-08 RX ADMIN — HEPARIN SODIUM 5000 UNIT(S): 5000 INJECTION INTRAVENOUS; SUBCUTANEOUS at 22:23

## 2023-12-08 RX ADMIN — LETROZOLE 2.5 MILLIGRAM(S): 2.5 TABLET, FILM COATED ORAL at 12:54

## 2023-12-08 RX ADMIN — Medication 81 MILLIGRAM(S): at 12:54

## 2023-12-08 RX ADMIN — TRAMADOL HYDROCHLORIDE 25 MILLIGRAM(S): 50 TABLET ORAL at 14:12

## 2023-12-08 RX ADMIN — ATORVASTATIN CALCIUM 10 MILLIGRAM(S): 80 TABLET, FILM COATED ORAL at 22:23

## 2023-12-08 RX ADMIN — HEPARIN SODIUM 5000 UNIT(S): 5000 INJECTION INTRAVENOUS; SUBCUTANEOUS at 12:53

## 2023-12-08 RX ADMIN — Medication 2 SPRAY(S): at 11:05

## 2023-12-08 RX ADMIN — TRAMADOL HYDROCHLORIDE 25 MILLIGRAM(S): 50 TABLET ORAL at 09:47

## 2023-12-08 RX ADMIN — TRAMADOL HYDROCHLORIDE 25 MILLIGRAM(S): 50 TABLET ORAL at 10:28

## 2023-12-08 RX ADMIN — Medication 100 MILLIGRAM(S): at 22:24

## 2023-12-08 RX ADMIN — TRAMADOL HYDROCHLORIDE 25 MILLIGRAM(S): 50 TABLET ORAL at 05:05

## 2023-12-08 RX ADMIN — TRAMADOL HYDROCHLORIDE 25 MILLIGRAM(S): 50 TABLET ORAL at 22:23

## 2023-12-08 NOTE — PROGRESS NOTE ADULT - SUBJECTIVE AND OBJECTIVE BOX
Subjective & Objective:  Pt was seen and examined by thoracic surgery.   PT is lying comfortable on bed, not in acute distress, on room air.  Denies any new complaints, able to ambulate with assistance.  Denies SOB, chest pain, nausea, vomiting, fever.     Vital Signs:  Vital Signs Last 24 Hrs  T(C): 37 (12-08-23 @ 05:00), Max: 37 (12-07-23 @ 20:17)  T(F): 98.6 (12-08-23 @ 05:00), Max: 98.6 (12-07-23 @ 20:17)  HR: 78 (12-08-23 @ 05:00) (78 - 94)  BP: 153/64 (12-08-23 @ 05:00) (141/62 - 154/65)  RR: 18 (12-08-23 @ 05:00) (16 - 18)  SpO2: 97% (12-08-23 @ 05:00) (95% - 97%) on (O2)    PE  General: awake and alert NAD  Neurology: A&Ox3, nonfocal, CHERRY x 4  Respiratory: CTA B/L  CV: RRR, S1S2, no murmurs, rubs or gallops  Abdominal: Soft, NT, ND +BS, Last BM  Extremities: No edema, + peripheral pulses  Incisions: c,d,i  Tubes: R chest tube to waterseal, +FEAL noted this morning, output 65 ml  Relevant labs, radiology and Medications reviewed                        11.2   9.05  )-----------( 261      ( 08 Dec 2023 06:27 )             33.9     12-08    137  |  99  |  15  ----------------------------<  99  4.2   |  28  |  0.79    Ca    8.7      08 Dec 2023 06:27    MEDICATIONS  (STANDING):  acetaminophen     Tablet .. 650 milliGRAM(s) Oral every 6 hours  aspirin enteric coated 81 milliGRAM(s) Oral daily  atorvastatin 10 milliGRAM(s) Oral at bedtime  fluticasone propionate 50 MICROgram(s)/spray Nasal Spray 2 Spray(s) Both Nostrils every 12 hours  gabapentin 100 milliGRAM(s) Oral at bedtime  heparin   Injectable 5000 Unit(s) SubCutaneous every 8 hours  letrozole 2.5 milliGRAM(s) Oral daily  lidocaine   4% Patch 1 Patch Transdermal every 24 hours  traZODone 100 milliGRAM(s) Oral at bedtime    MEDICATIONS  (PRN):  naloxone Injectable 0.1 milliGRAM(s) IV Push every 3 minutes PRN For ANY of the following changes in patient status:  A. RR LESS THAN 10 breaths per minute, B. Oxygen saturation LESS THAN 90%, C. Sedation score of 6  ondansetron Injectable 4 milliGRAM(s) IV Push every 6 hours PRN Nausea  traMADol 25 milliGRAM(s) Oral every 4 hours PRN Moderate to Severe Pain (4 - 10)    Pertinent Physical Exam  I&O's Summary    07 Dec 2023 07:01  -  08 Dec 2023 07:00  --------------------------------------------------------  IN: 890 mL / OUT: 1165 mL / NET: -275 mL      Assessment  77y/o female states, "hx of left Vats, LLLobectomy 2016 dx with adenocarcinoma, left breast cancer s/p lumpectomy radiation 2020,  right lung mass for 2 yrs, recent imaging shows increase in size." Patient s/p Flex bronch, Uniportal Right Vats, RUL anterior segmentectomy on 12/6/2023. 12//7 Richardson removed, pt able to void with no issues. 12/8 - Postop right chest tube remained due +FEAL.     PLAN  Neuro: Pain management  Pulm: Encourage coughing, deep breathing and use of incentive spirometry. Wean off supplemental oxygen as able. Daily CXR.   Cardio: Monitor telemetry/alarms  GI: Tolerating diet. Continue stool softeners.  Renal: monitor urine output, supplement electrolytes as needed  Vasc: Heparin SC/SCDs for DVT prophylaxis  Heme: Stable H/H.  ID: Off antibiotics. Stable.  Therapy: OOB/ambulate  Tubes: Monitor Chest tube output, + FEAL today  Disposition: Aim to D/C to home once air leak resolves and chest tube can be removed  Discussed with Cardiothoracic Team at AM rounds.      Subjective & Objective:  Pt was seen and examined by thoracic surgery.   PT is lying comfortable on bed, not in acute distress, on room air.  Denies any new complaints, able to ambulate with assistance.  Denies SOB, chest pain, nausea, vomiting, fever.     Vital Signs:  Vital Signs Last 24 Hrs  T(C): 37 (12-08-23 @ 05:00), Max: 37 (12-07-23 @ 20:17)  T(F): 98.6 (12-08-23 @ 05:00), Max: 98.6 (12-07-23 @ 20:17)  HR: 78 (12-08-23 @ 05:00) (78 - 94)  BP: 153/64 (12-08-23 @ 05:00) (141/62 - 154/65)  RR: 18 (12-08-23 @ 05:00) (16 - 18)  SpO2: 97% (12-08-23 @ 05:00) (95% - 97%) on (O2)    PE  General: awake and alert NAD  Neurology: A&Ox3, nonfocal, CHERRY x 4  Respiratory: CTA B/L  CV: RRR, S1S2, no murmurs, rubs or gallops  Abdominal: Soft, NT, ND +BS, Last BM  Extremities: No edema, + peripheral pulses  Incisions: c,d,i  Tubes: R chest tube to waterseal, +FEAL noted this morning, output 65 ml  Relevant labs, radiology and Medications reviewed                        11.2   9.05  )-----------( 261      ( 08 Dec 2023 06:27 )             33.9     12-08    137  |  99  |  15  ----------------------------<  99  4.2   |  28  |  0.79    Ca    8.7      08 Dec 2023 06:27    MEDICATIONS  (STANDING):  acetaminophen     Tablet .. 650 milliGRAM(s) Oral every 6 hours  aspirin enteric coated 81 milliGRAM(s) Oral daily  atorvastatin 10 milliGRAM(s) Oral at bedtime  fluticasone propionate 50 MICROgram(s)/spray Nasal Spray 2 Spray(s) Both Nostrils every 12 hours  gabapentin 100 milliGRAM(s) Oral at bedtime  heparin   Injectable 5000 Unit(s) SubCutaneous every 8 hours  letrozole 2.5 milliGRAM(s) Oral daily  lidocaine   4% Patch 1 Patch Transdermal every 24 hours  traZODone 100 milliGRAM(s) Oral at bedtime    MEDICATIONS  (PRN):  naloxone Injectable 0.1 milliGRAM(s) IV Push every 3 minutes PRN For ANY of the following changes in patient status:  A. RR LESS THAN 10 breaths per minute, B. Oxygen saturation LESS THAN 90%, C. Sedation score of 6  ondansetron Injectable 4 milliGRAM(s) IV Push every 6 hours PRN Nausea  traMADol 25 milliGRAM(s) Oral every 4 hours PRN Moderate to Severe Pain (4 - 10)    Pertinent Physical Exam  I&O's Summary    07 Dec 2023 07:01  -  08 Dec 2023 07:00  --------------------------------------------------------  IN: 890 mL / OUT: 1165 mL / NET: -275 mL      Assessment  79y/o female states, "hx of left Vats, LLLobectomy 2016 dx with adenocarcinoma, left breast cancer s/p lumpectomy radiation 2020,  right lung mass for 2 yrs, recent imaging shows increase in size." Patient s/p Flex bronch, Uniportal Right Vats, RUL anterior segmentectomy on 12/6/2023. 12//7 Richardson removed, pt able to void with no issues. 12/8 - Postop right chest tube remained due +FEAL.     PLAN  Neuro: Pain management  Pulm: Encourage coughing, deep breathing and use of incentive spirometry. Wean off supplemental oxygen as able. Daily CXR.   Cardio: Monitor telemetry/alarms  GI: Tolerating diet. Continue stool softeners.  Renal: monitor urine output, supplement electrolytes as needed  Vasc: Heparin SC/SCDs for DVT prophylaxis  Heme: Stable H/H.  ID: Off antibiotics. Stable.  Therapy: OOB/ambulate  Tubes: Monitor Chest tube output, + FEAL today  Disposition: Aim to D/C to home once air leak resolves and chest tube can be removed  Discussed with Cardiothoracic Team at AM rounds.

## 2023-12-09 ENCOUNTER — TRANSCRIPTION ENCOUNTER (OUTPATIENT)
Age: 78
End: 2023-12-09

## 2023-12-09 VITALS
DIASTOLIC BLOOD PRESSURE: 64 MMHG | OXYGEN SATURATION: 93 % | SYSTOLIC BLOOD PRESSURE: 148 MMHG | TEMPERATURE: 99 F | RESPIRATION RATE: 18 BRPM | HEART RATE: 96 BPM

## 2023-12-09 PROCEDURE — 71045 X-RAY EXAM CHEST 1 VIEW: CPT | Mod: 26

## 2023-12-09 RX ORDER — SENNA PLUS 8.6 MG/1
2 TABLET ORAL
Qty: 0 | Refills: 0 | DISCHARGE

## 2023-12-09 RX ORDER — TRAMADOL HYDROCHLORIDE 50 MG/1
1 TABLET ORAL
Qty: 10 | Refills: 0
Start: 2023-12-09

## 2023-12-09 RX ORDER — POLYETHYLENE GLYCOL 3350 17 G/17G
17 POWDER, FOR SOLUTION ORAL
Qty: 0 | Refills: 0 | DISCHARGE

## 2023-12-09 RX ORDER — ACETAMINOPHEN 500 MG
2 TABLET ORAL
Qty: 0 | Refills: 0 | DISCHARGE

## 2023-12-09 RX ADMIN — Medication 81 MILLIGRAM(S): at 13:28

## 2023-12-09 RX ADMIN — LIDOCAINE 1 PATCH: 4 CREAM TOPICAL at 13:27

## 2023-12-09 RX ADMIN — LETROZOLE 2.5 MILLIGRAM(S): 2.5 TABLET, FILM COATED ORAL at 13:28

## 2023-12-09 NOTE — DISCHARGE NOTE NURSING/CASE MANAGEMENT/SOCIAL WORK - NSDCPEFALRISK_GEN_ALL_CORE
For information on Fall & Injury Prevention, visit: [email or web address] OR  [email or web address] OR  https://www.cdc.gov/steadi/patient.html For information on Fall & Injury Prevention, visit: [email or web address] OR  [email or web address] OR  https://www.cdc.gov/steadi/patient.html

## 2023-12-09 NOTE — DISCHARGE NOTE NURSING/CASE MANAGEMENT/SOCIAL WORK - NSDCFUADDAPPT_GEN_ALL_CORE_FT
Follow up with Dr. Branch in 2 weeks Call for your apt. [phone]  Chest x-ray prior to appointment with Dr. Branch.    Follow up with primary care provider in one week  Follow up with Dr. Branch in 2 weeks Call for your apt. [phone]  Chest x-ray prior to appointment with Dr. Branch.    Follow up with primary care provider in one week

## 2023-12-09 NOTE — DISCHARGE NOTE NURSING/CASE MANAGEMENT/SOCIAL WORK - PATIENT PORTAL LINK FT
You can access the FollowMyHealth Patient Portal offered by [facility] by registering at the following website: [email or web address]. By joining [employer]’s FollowMyHealth portal, you will also be able to view your health information using other applications (apps) compatible with our system. You can access the FollowMyHealth Patient Portal offered by [facility] by registering at the following website: [email or web address]. By joining [employer]’s FollowMyHealth portal, you will also be able to view your health information using other applications (apps) compatible with our system.

## 2023-12-11 PROBLEM — G47.00 INSOMNIA, UNSPECIFIED: Chronic | Status: ACTIVE | Noted: 2023-11-30

## 2023-12-11 PROBLEM — Z92.3 PERSONAL HISTORY OF IRRADIATION: Chronic | Status: ACTIVE | Noted: 2023-11-30

## 2023-12-11 PROBLEM — C50.912 MALIGNANT NEOPLASM OF UNSPECIFIED SITE OF LEFT FEMALE BREAST: Chronic | Status: ACTIVE | Noted: 2023-11-30

## 2023-12-11 PROBLEM — C34.90 MALIGNANT NEOPLASM OF UNSPECIFIED PART OF UNSPECIFIED BRONCHUS OR LUNG: Chronic | Status: ACTIVE | Noted: 2023-11-30

## 2023-12-11 PROBLEM — J43.9 EMPHYSEMA, UNSPECIFIED: Chronic | Status: ACTIVE | Noted: 2023-11-30

## 2023-12-11 PROBLEM — C80.1 MALIGNANT (PRIMARY) NEOPLASM, UNSPECIFIED: Chronic | Status: ACTIVE | Noted: 2023-11-30

## 2023-12-11 PROBLEM — I82.401 ACUTE EMBOLISM AND THROMBOSIS OF UNSPECIFIED DEEP VEINS OF RIGHT LOWER EXTREMITY: Chronic | Status: ACTIVE | Noted: 2023-11-30

## 2023-12-15 LAB
SURGICAL PATHOLOGY STUDY: SIGNIFICANT CHANGE UP
SURGICAL PATHOLOGY STUDY: SIGNIFICANT CHANGE UP

## 2023-12-19 ENCOUNTER — APPOINTMENT (OUTPATIENT)
Dept: THORACIC SURGERY | Facility: CLINIC | Age: 78
End: 2023-12-19
Payer: MEDICARE

## 2023-12-19 ENCOUNTER — OUTPATIENT (OUTPATIENT)
Dept: OUTPATIENT SERVICES | Facility: HOSPITAL | Age: 78
LOS: 1 days | End: 2023-12-19
Payer: MEDICARE

## 2023-12-19 ENCOUNTER — APPOINTMENT (OUTPATIENT)
Dept: RADIOLOGY | Facility: HOSPITAL | Age: 78
End: 2023-12-19

## 2023-12-19 ENCOUNTER — RESULT REVIEW (OUTPATIENT)
Age: 78
End: 2023-12-19

## 2023-12-19 VITALS
SYSTOLIC BLOOD PRESSURE: 155 MMHG | WEIGHT: 153 LBS | HEIGHT: 61 IN | RESPIRATION RATE: 16 BRPM | BODY MASS INDEX: 28.89 KG/M2 | HEART RATE: 103 BPM | DIASTOLIC BLOOD PRESSURE: 87 MMHG | OXYGEN SATURATION: 96 %

## 2023-12-19 DIAGNOSIS — Z98.890 OTHER SPECIFIED POSTPROCEDURAL STATES: Chronic | ICD-10-CM

## 2023-12-19 DIAGNOSIS — Z96.651 PRESENCE OF RIGHT ARTIFICIAL KNEE JOINT: Chronic | ICD-10-CM

## 2023-12-19 DIAGNOSIS — J84.89 OTHER SPECIFIED INTERSTITIAL PULMONARY DISEASES: ICD-10-CM

## 2023-12-19 DIAGNOSIS — Z98.89 OTHER SPECIFIED POSTPROCEDURAL STATES: Chronic | ICD-10-CM

## 2023-12-19 DIAGNOSIS — R91.8 OTHER NONSPECIFIC ABNORMAL FINDING OF LUNG FIELD: ICD-10-CM

## 2023-12-19 DIAGNOSIS — H26.9 UNSPECIFIED CATARACT: Chronic | ICD-10-CM

## 2023-12-19 PROCEDURE — 71046 X-RAY EXAM CHEST 2 VIEWS: CPT | Mod: 26

## 2023-12-19 PROCEDURE — 99024 POSTOP FOLLOW-UP VISIT: CPT

## 2023-12-19 NOTE — DISCUSSION/SUMMARY
[Doing Well] : is doing well [Fair Pain Control] : has fair pain control [No Sign of Infection] : is showing no signs of infection [3] : 3 [Remove Sutures/Staples] : removed sutures/staples [de-identified] : increased sensation to Rt sided chest wall

## 2023-12-19 NOTE — CONSULT LETTER
[Dear  ___] : Dear  [unfilled], [Consult Letter:] : I had the pleasure of evaluating your patient, [unfilled]. [( Thank you for referring [unfilled] for consultation for _____ )] : Thank you for referring [unfilled] for consultation for [unfilled] [Please see my note below.] : Please see my note below. [Consult Closing:] : Thank you very much for allowing me to participate in the care of this patient.  If you have any questions, please do not hesitate to contact me. [Sincerely,] : Sincerely, [FreeTextEntry2] : Dr. Patti Gupta (Pulm at [facility]) Dr. PORSHA Pradhan (PCP)  [FreeTextEntry3] : Lui Branch MD, FACS \par  Chief, Division of Thoracic Surgery \par  Director, Minimally Invasive Thoracic Surgery \par  Department of Cardiovascular and Thoracic Surgery \par  [facility] \par  , Cardiovascular and Thoracic Surgery\par  \par  \par

## 2023-12-19 NOTE — REASON FOR VISIT
[Family Member] : family member [de-identified] : Flexible bronchoscopy, uniportal right video-assisted thoracic surgery, anterior segmentectomy right upper lobe. [de-identified] : 12/6/23

## 2023-12-19 NOTE — ASSESSMENT
[FreeTextEntry1] : Ms. Jung is a 78 year old female who presents today for follow up. She is s/p uniportal Left VATS, LLLobectomy on 16, pathology revealed T1aN0 adenocarcinoma, papillary predominant. Breast cancer , s/p lumpectomy and subsequent RT 2021.  Pulmonologist: Dr. Patti uGpta.  Chest CT done 19 revealed: - s/p LLLobectomy - mild centrilobular pulmonary edema - no focal pulmonary masses or nodules - stable subcentimeter hypodense nodule right thyroid node - stable small 1 cm nodule of left adrenal gland  CT Chest on 2020: - stable mild-to-moderate centrilobular emphysematous changes bilaterally - faint patchy peripheral ggo in the medial RLL - focally impacted bronchus in the Lt inferomedial lung (4:2-3) diminished in density  OF NOTE: Diagnosed with Breast cancer in , s/p lumpectomy and subsequent RT 2021  CT chest on 21: - Post op changes - Developing ground glass nodular density in the RUL (Series 4, Image [id number]); It blends to a group of blebs. Ther overall size measures approximately 1.7 cm - Previously reported mild ground glass in the RLL has resolved - Stable 2 mm peripheral nodule in Left upper lung (image 121) Stable punctate calcified nodule MEIR (image 108) - RLL nodule unchanged since 2018 - New left breast skin and trabecular thickening; postsurgical changes in the left axilla - Small hiatal hernia - s/p cholecystectomy  CT chest on 2021: - Post op changes - Unchanged, mild groundglass adjacent to centrilobular emphysematous lucencies in the anterobasal RUL progressed from 20, new from 3/7/2017 - Coronary artery calcifications  CT chest on 22: - RUL groundglass density 2 x 2.1 cm; (2021: 1.8 x 1.9 cm; 2020: 9mm) - Left renal cysts  CT Chest on 22: - Post op changes - RUL 2.2 x 2.3 cm groundglass nodule (Series 3, Image 95) Previously 2 cm - Stable anterior mediastinal LN: 1.4 x 0.6 cm  Spirometry on 2022: FVC 2.08, 87%; FEV1 1.33, 75%  Recently seen at [facility] ED with c/o right leg pain, dx with right DVT and now on Eliquis.  CT Chest on 2023 ([facility]): - There are patchy areas of groundglass opacity in te right chest anteriorly with some dependent atelectasis are noted, possible pneumonitis. - There is a small hiatal hernia.  CT Chest on 2023 (R): - Stable postsurgical changes consistent with partial left lower lobectomy. - Irregular groundglass opacity in RUL now measuring 2.3 x 2.8 x 2.9, previously 2.3 x 2.5 cm in  and 0.9 x 1.2 cm in . - Centrilobular emphysema in the upper lung zones. - Calcified granuloma left upper lobe, . - There is severe coronary artery calcification  Seen in office on 10/10: CT scan reviewed, RUL ggo continues to grow, but remains solely groundglass without any solid component. will re-evaluate this nodule in 4 months with a CT Chest w/o contrast, if it continues to grow, will then schedule PFTs and Quantitative V/Q scan and discuss surgery.  Pt called back PFTs on 2023: FVC: 2.29 (96%); FEV1: 1.36 (78%); DLCO: 106 (58%)  VQ Scan on 2023: Right lun%;   Left lun% Right upper lun%  Left upper lun% Right mid lun% Left mid lun% Right lower lun% Left lower lun%  Now, s/p Flex bronch, uniportal right VATS, anterior segmentectomy right upper lobe on 23. Path: RUL: Organizing pneumonia in a background of emphysematous changes. AFB and GMS (fungal) stains pending.  Post-operative course significant for air leak that resolved.   CXR on 23: reviewed, no PTX.  Patient presents for post op follow up.   I have reviewed the patient's medical records and diagnostic images at time of this office consultation and have made the following recommendation: 1. Path reviewed and explained to patient, negative for malignancy, organizing pneumonia, RTC in 2 months with CT Chest w/o contrast to re-evaluate for resolution.   I, Dr. TOVAR [facility], personally performed the evaluation and management (E/M) services for this established patient who presents today with (a) new problem(s)/exacerbation of (an) existing condition(s). That E/M includes conducting the examination, assessing all new/exacerbated conditions, and establishing a new plan of care. Today, my ACP, Michelle Wiseman NP was here to observe my evaluation and management services for this new problem/exacerbated condition to be followed going forward.

## 2024-01-23 ENCOUNTER — NON-APPOINTMENT (OUTPATIENT)
Age: 79
End: 2024-01-23

## 2024-02-20 ENCOUNTER — APPOINTMENT (OUTPATIENT)
Dept: THORACIC SURGERY | Facility: CLINIC | Age: 79
End: 2024-02-20
Payer: MEDICARE

## 2024-02-20 VITALS
OXYGEN SATURATION: 97 % | HEART RATE: 83 BPM | WEIGHT: 153 LBS | BODY MASS INDEX: 28.89 KG/M2 | DIASTOLIC BLOOD PRESSURE: 79 MMHG | HEIGHT: 61 IN | RESPIRATION RATE: 17 BRPM | SYSTOLIC BLOOD PRESSURE: 154 MMHG

## 2024-02-20 DIAGNOSIS — C34.90 MALIGNANT NEOPLASM OF UNSPECIFIED PART OF UNSPECIFIED BRONCHUS OR LUNG: ICD-10-CM

## 2024-02-20 PROCEDURE — 99213 OFFICE O/P EST LOW 20 MIN: CPT | Mod: 24

## 2024-02-20 NOTE — HISTORY OF PRESENT ILLNESS
[FreeTextEntry1] : Ms. Jung is a 78 year old female who presents today for follow up. She is s/p uniportal Left VATS, LLLobectomy on 16, pathology revealed T1aN0 adenocarcinoma, papillary predominant. Breast cancer , s/p lumpectomy and subsequent RT 2021.  Pulmonologist: Dr. Patti Gupta.  Chest CT done 19 revealed: - s/p LLLobectomy - mild centrilobular pulmonary edema - no focal pulmonary masses or nodules - stable subcentimeter hypodense nodule right thyroid node - stable small 1 cm nodule of left adrenal gland  CT Chest on 2020: - stable mild-to-moderate centrilobular emphysematous changes bilaterally - faint patchy peripheral ggo in the medial RLL - focally impacted bronchus in the Lt inferomedial lung (4:2-3) diminished in density  OF NOTE: Diagnosed with Breast cancer in , s/p lumpectomy and subsequent RT 2021  CT chest on 21: - Post op changes - Developing ground glass nodular density in the RUL (Series 4, Image [id number]); It blends to a group of blebs. Ther overall size measures approximately 1.7 cm - Previously reported mild ground glass in the RLL has resolved - Stable 2 mm peripheral nodule in Left upper lung (image 121) Stable punctate calcified nodule MEIR (image 108) - RLL nodule unchanged since 2018 - New left breast skin and trabecular thickening; postsurgical changes in the left axilla - Small hiatal hernia - s/p cholecystectomy  CT chest on 2021: - Post op changes - Unchanged, mild groundglass adjacent to centrilobular emphysematous lucencies in the anterobasal RUL progressed from 20, new from 3/7/2017 - Coronary artery calcifications  CT chest on 22: - RUL groundglass density 2 x 2.1 cm; (2021: 1.8 x 1.9 cm; 2020: 9mm) - Left renal cysts  CT Chest on 22: - Post op changes - RUL 2.2 x 2.3 cm groundglass nodule (Series 3, Image 95) Previously 2 cm - Stable anterior mediastinal LN: 1.4 x 0.6 cm  Spirometry on 2022: FVC 2.08, 87%; FEV1 1.33, 75%  Recently seen at [facility] ED with c/o right leg pain, dx with right DVT and now on Eliquis.  CT Chest on 2023 ([facility]): - There are patchy areas of groundglass opacity in te right chest anteriorly with some dependent atelectasis are noted, possible pneumonitis. - There is a small hiatal hernia.  CT Chest on 2023 (R): - Stable postsurgical changes consistent with partial left lower lobectomy. - Irregular groundglass opacity in RUL now measuring 2.3 x 2.8 x 2.9, previously 2.3 x 2.5 cm in  and 0.9 x 1.2 cm in . - Centrilobular emphysema in the upper lung zones. - Calcified granuloma left upper lobe, . - There is severe coronary artery calcification  Seen in office on 10/10: CT scan reviewed, RUL ggo continues to grow, but remains solely groundglass without any solid component. will re-evaluate this nodule in 4 months with a CT Chest w/o contrast, if it continues to grow, will then schedule PFTs and Quantitative V/Q scan and discuss surgery.  Pt called back PFTs on 2023: FVC: 2.29 (96%); FEV1: 1.36 (78%); DLCO: 106 (58%)  VQ Scan on 2023: Right lun%;   Left lun% Right upper lun%  Left upper lun% Right mid lun% Left mid lun% Right lower lun% Left lower lun%  Now, s/p Flex bronch, uniportal right VATS, anterior segmentectomy right upper lobe on 23. Path: RUL: Organizing pneumonia in a background of emphysematous changes. AFB and GMS (fungal) stains pending.  Post-operative course significant for air leak that resolved.   CXR on 23: reviewed, no PTX.  CT Chest on 02/15/2024 (R): - Interval recent postsurgical changes right upper lobe. - Focal area of consolidation/atelectasis at the postsurgical site extending along the minor fissure involving the right upper lobe. - Previous postsurgical changes left lower lobe.  - Small focal area of lung herniation anterior right middle lobe best visualized series 3 image 164.  - Central lobular emphysema. - Stable T8 vertebral compression fracture   Patient presents today for follow up. Overall, she reports to be feeling well. Denies any chest pain, shortness of breath, cough, or hemoptysis.

## 2024-02-20 NOTE — ASSESSMENT
[FreeTextEntry1] : Ms. Jung is a 78 year old female who presents today for follow up. She is s/p uniportal Left VATS, LLLobectomy on 11/21/16, pathology revealed T1aN0 adenocarcinoma, papillary predominant. Breast cancer 2020, s/p lumpectomy and subsequent RT 2/2021.  Pulmonologist: Dr. Patti Gupta.  OF NOTE: Diagnosed with Breast cancer in 2020, s/p lumpectomy and subsequent RT 2/2021  Now, s/p Flex bronch, uniportal right VATS, anterior segmentectomy right upper lobe on 12/6/23. Path: RUL: Organizing pneumonia in a background of emphysematous changes. AFB and GMS (fungal) stains pending.  Post-operative course significant for air leak that resolved.   Patient presents today with follow up imaging.   I have reviewed the patient's medical records and diagnostic images at time of this office consultation and have made the following recommendation: 1. CT Chest reviewed with patient, stable scan. I discussed she returns to clinic in 6 months with repeat CT Chest without contrast. 2. Continue follow up with pulm.  Recommendations reviewed with patient during this office visit, and all questions answered; Patient instructed on the importance of follow up and verbalizes understanding.   I, CRYSTAL Metz, personally performed the evaluation and management (E/M) services for this established patient. That E/M includes conducting the examination, assessing all new/exacerbated conditions, and establishing a new plan of care. Today, my ACP, Sarabjit Conrad NP, was here to observe my evaluation and management services for this new problem/exacerbated condition to be followed going forward.

## 2024-02-20 NOTE — CONSULT LETTER
[Dear  ___] : Dear  [unfilled], [Consult Letter:] : I had the pleasure of evaluating your patient, [unfilled]. [( Thank you for referring [unfilled] for consultation for _____ )] : Thank you for referring [unfilled] for consultation for [unfilled] [Please see my note below.] : Please see my note below. [Consult Closing:] : Thank you very much for allowing me to participate in the care of this patient.  If you have any questions, please do not hesitate to contact me. [Sincerely,] : Sincerely, [FreeTextEntry2] : Dr. Patti Gupta (Pulm at [facility]) Dr. PORSHA Pradhan (PCP)  [FreeTextEntry3] : Lui Branch MD, FACS \par  Chief, Division of Thoracic Surgery \par  Director, Minimally Invasive Thoracic Surgery \par  Department of Cardiovascular and Thoracic Surgery \par  [facility] \par  , Cardiovascular and Thoracic Surgery\par  \par  \par

## 2024-08-20 ENCOUNTER — APPOINTMENT (OUTPATIENT)
Dept: THORACIC SURGERY | Facility: CLINIC | Age: 79
End: 2024-08-20
Payer: MEDICARE

## 2024-08-20 VITALS
SYSTOLIC BLOOD PRESSURE: 135 MMHG | OXYGEN SATURATION: 95 % | WEIGHT: 150 LBS | TEMPERATURE: 97.6 F | DIASTOLIC BLOOD PRESSURE: 82 MMHG | HEART RATE: 81 BPM | BODY MASS INDEX: 28.32 KG/M2 | HEIGHT: 61 IN | RESPIRATION RATE: 18 BRPM

## 2024-08-20 DIAGNOSIS — C34.90 MALIGNANT NEOPLASM OF UNSPECIFIED PART OF UNSPECIFIED BRONCHUS OR LUNG: ICD-10-CM

## 2024-08-20 PROCEDURE — 99213 OFFICE O/P EST LOW 20 MIN: CPT

## 2024-08-20 NOTE — CONSULT LETTER
[Dear  ___] : Dear  [unfilled], [Consult Letter:] : I had the pleasure of evaluating your patient, [unfilled]. [( Thank you for referring [unfilled] for consultation for _____ )] : Thank you for referring [unfilled] for consultation for [unfilled] [Please see my note below.] : Please see my note below. [Consult Closing:] : Thank you very much for allowing me to participate in the care of this patient.  If you have any questions, please do not hesitate to contact me. [Sincerely,] : Sincerely, [FreeTextEntry3] : Lui Branch MD, FACS \par  Chief, Division of Thoracic Surgery \par  Director, Minimally Invasive Thoracic Surgery \par  Department of Cardiovascular and Thoracic Surgery \par  [facility] \par  , Cardiovascular and Thoracic Surgery\par  \par  \par   [FreeTextEntry2] : Dr. Patti Gupta (Pulm at [facility]) Dr. PORSHA Pradhan (PCP)

## 2024-08-20 NOTE — ASSESSMENT
[FreeTextEntry1] : Ms. Jung is a 79 year old female who presents today for follow up. She is s/p uniportal Left VATS, LLLobectomy on 11/21/16, pathology revealed T1aN0 adenocarcinoma, papillary predominant. Breast cancer 2020, s/p lumpectomy and subsequent RT 2/2021.  Pulmonologist: Dr. Patti Gupta.  OF NOTE: Diagnosed with Breast cancer in 2020, s/p lumpectomy and subsequent RT 2/2021  Now, s/p Flex bronch, uniportal right VATS, anterior segmentectomy right upper lobe on 12/6/23. Path: RUL: Organizing pneumonia in a background of emphysematous changes. AFB and GMS (fungal) negative.  Post-operative course significant for air leak that resolved.   Patient presents today with follow up imaging.   I have reviewed the patient's medical records and diagnostic images at time of this office consultation and have made the following recommendation: 1. CT Chest reviewed with patient and daughter, no evidence of recurrence, stable scan. I discussed she returns to clinic in 6 months with repeat CT Chest without contrast. She is agreeable.   Recommendations reviewed with patient during this office visit, and all questions answered; Patient instructed on the importance of follow up and verbalizes understanding.   I, CRYSTAL Metz, personally performed the evaluation and management (E/M) services for this established patient. That E/M includes conducting the examination, assessing all new/exacerbated conditions, and establishing a new plan of care. Today, my ACP, Sarabjit Conrad NP, was here to observe my evaluation and management services for this new problem/exacerbated condition to be followed going forward.

## 2024-08-20 NOTE — HISTORY OF PRESENT ILLNESS
[FreeTextEntry1] : Ms. Jung is a 79 year old female who presents today for follow up. She is s/p uniportal Left VATS, LLLobectomy on 16, pathology revealed T1aN0 adenocarcinoma, papillary predominant. Breast cancer , s/p lumpectomy and subsequent RT 2021.  Pulmonologist: Dr. Patti Gupta.  Chest CT done 19 revealed: - s/p LLLobectomy - mild centrilobular pulmonary edema - no focal pulmonary masses or nodules - stable subcentimeter hypodense nodule right thyroid node - stable small 1 cm nodule of left adrenal gland  CT Chest on 2020: - stable mild-to-moderate centrilobular emphysematous changes bilaterally - faint patchy peripheral ggo in the medial RLL - focally impacted bronchus in the Lt inferomedial lung (4:2-3) diminished in density  OF NOTE: Diagnosed with Breast cancer in , s/p lumpectomy and subsequent RT 2021  CT chest on 21: - Post op changes - Developing ground glass nodular density in the RUL (Series 4, Image [id number]); It blends to a group of blebs. Ther overall size measures approximately 1.7 cm - Previously reported mild ground glass in the RLL has resolved - Stable 2 mm peripheral nodule in Left upper lung (image 121) Stable punctate calcified nodule MEIR (image 108) - RLL nodule unchanged since 2018 - New left breast skin and trabecular thickening; postsurgical changes in the left axilla - Small hiatal hernia - s/p cholecystectomy  CT chest on 2021: - Post op changes - Unchanged, mild groundglass adjacent to centrilobular emphysematous lucencies in the anterobasal RUL progressed from 20, new from 3/7/2017 - Coronary artery calcifications  CT chest on 22: - RUL groundglass density 2 x 2.1 cm; (2021: 1.8 x 1.9 cm; 2020: 9mm) - Left renal cysts  CT Chest on 22: - Post op changes - RUL 2.2 x 2.3 cm groundglass nodule (Series 3, Image 95) Previously 2 cm - Stable anterior mediastinal LN: 1.4 x 0.6 cm  Spirometry on 2022: FVC 2.08, 87%; FEV1 1.33, 75%  Recently seen at [facility] ED with c/o right leg pain, dx with right DVT and now on Eliquis.  CT Chest on 2023 ([facility]): - There are patchy areas of groundglass opacity in te right chest anteriorly with some dependent atelectasis are noted, possible pneumonitis. - There is a small hiatal hernia.  CT Chest on 2023 (R): - Stable postsurgical changes consistent with partial left lower lobectomy. - Irregular groundglass opacity in RUL now measuring 2.3 x 2.8 x 2.9, previously 2.3 x 2.5 cm in  and 0.9 x 1.2 cm in . - Centrilobular emphysema in the upper lung zones. - Calcified granuloma left upper lobe, . - There is severe coronary artery calcification  Seen in office on 10/10: CT scan reviewed, RUL ggo continues to grow, but remains solely groundglass without any solid component. will re-evaluate this nodule in 4 months with a CT Chest w/o contrast, if it continues to grow, will then schedule PFTs and Quantitative V/Q scan and discuss surgery.  Pt called back PFTs on 2023: FVC: 2.29 (96%); FEV1: 1.36 (78%); DLCO: 106 (58%)  VQ Scan on 2023: Right lun%;   Left lun% Right upper lun%  Left upper lun% Right mid lun% Left mid lun% Right lower lun% Left lower lun%  Now, s/p Flex bronch, uniportal right VATS, anterior segmentectomy right upper lobe on 23. Path: RUL: Organizing pneumonia in a background of emphysematous changes. AFB and GMS (fungal) negative.  Post-operative course significant for air leak that resolved.   CXR on 23: reviewed, no PTX.  CT Chest on 02/15/2024 (R): - Interval recent postsurgical changes right upper lobe. - Focal area of consolidation/atelectasis at the postsurgical site extending along the minor fissure involving the right upper lobe. - Previous postsurgical changes left lower lobe.  - Small focal area of lung herniation anterior right middle lobe best visualized series 3 image 164.  - Central lobular emphysema. - Stable T8 vertebral compression fracture  CT Chest on 24: ([facility]) - There is moderate centrilobular emphysematous change.  - The patient is status post left lower lobectomy.  - Radiopaque staple line is seen in the right upper lobe. These show no significant change from prior study. - A tiny calcified granuloma is present in the left upper lobe on series 3, image 69.  - Emphysematous change.   - There is no suspicious nodule, mass or infiltrative change.   - Small sliding hiatal hernia.  Patient presents today for follow up. Overall, she reports to be feeling well. Denies any chest pain, shortness of breath, cough, or hemoptysis.

## 2024-08-20 NOTE — CONSULT LETTER
[Dear  ___] : Dear  [unfilled], [Consult Letter:] : I had the pleasure of evaluating your patient, [unfilled]. [( Thank you for referring [unfilled] for consultation for _____ )] : Thank you for referring [unfilled] for consultation for [unfilled] [Please see my note below.] : Please see my note below. [Consult Closing:] : Thank you very much for allowing me to participate in the care of this patient.  If you have any questions, please do not hesitate to contact me. [Sincerely,] : Sincerely, [FreeTextEntry2] : Dr. Patti Gupta (Pulm at [facility]) Dr. PORSHA Pradhan (PCP)  [FreeTextEntry3] : Lui Branch MD, FACS \par  Chief, Division of Thoracic Surgery \par  Director, Minimally Invasive Thoracic Surgery \par  Department of Cardiovascular and Thoracic Surgery \par  [facility] \par  , Cardiovascular and Thoracic Surgery\par  \par  \par

## 2024-11-06 NOTE — ASU PREOP CHECKLIST - SPO2 (%)
problems such as diabetes, high blood pressure, and high cholesterol. If you think you may have a problem with alcohol or drug use, talk to your doctor.   Medicines    Take your medicines exactly as prescribed. Call your doctor if you think you are having a problem with your medicine.     If your doctor recommends aspirin, take the amount directed each day. Make sure you take aspirin and not another kind of pain reliever, such as acetaminophen (Tylenol).   When should you call for help?   Call 911 if you have symptoms of a heart attack. These may include:    Chest pain or pressure, or a strange feeling in the chest.     Sweating.     Shortness of breath.     Pain, pressure, or a strange feeling in the back, neck, jaw, or upper belly or in one or both shoulders or arms.     Lightheadedness or sudden weakness.     A fast or irregular heartbeat.   After you call 911, the  may tell you to chew 1 adult-strength or 2 to 4 low-dose aspirin. Wait for an ambulance. Do not try to drive yourself.  Watch closely for changes in your health, and be sure to contact your doctor if you have any problems.  Where can you learn more?  Go to https://www.[employer].net/patientEd and enter F075 to learn more about \"A Healthy Heart: Care Instructions.\"  Current as of: June 24, 2023  Content Version: 14.2  © 2024 [employer].   Care instructions adapted under license by [employer]. If you have questions about a medical condition or this instruction, always ask your healthcare professional. [employer] disclaims any warranty or liability for your use of this information.      Personalized Preventive Plan for Leonides Appiah - 11/6/2024  Medicare offers a range of preventive health benefits. Some of the tests and screenings are paid in full while other may be subject to a deductible, co-insurance, and/or copay.    Some of these benefits include a comprehensive review of your medical history including lifestyle,  96

## 2025-08-15 ENCOUNTER — NON-APPOINTMENT (OUTPATIENT)
Age: 80
End: 2025-08-15

## 2025-08-19 ENCOUNTER — APPOINTMENT (OUTPATIENT)
Dept: THORACIC SURGERY | Facility: CLINIC | Age: 80
End: 2025-08-19

## 2025-08-21 ENCOUNTER — APPOINTMENT (OUTPATIENT)
Dept: THORACIC SURGERY | Facility: CLINIC | Age: 80
End: 2025-08-21
Payer: MEDICARE

## 2025-08-21 VITALS
HEART RATE: 97 BPM | SYSTOLIC BLOOD PRESSURE: 161 MMHG | DIASTOLIC BLOOD PRESSURE: 85 MMHG | OXYGEN SATURATION: 95 % | BODY MASS INDEX: 27.6 KG/M2 | HEIGHT: 62 IN | WEIGHT: 150 LBS

## 2025-08-21 DIAGNOSIS — C34.90 MALIGNANT NEOPLASM OF UNSPECIFIED PART OF UNSPECIFIED BRONCHUS OR LUNG: ICD-10-CM

## 2025-08-21 PROCEDURE — 99213 OFFICE O/P EST LOW 20 MIN: CPT

## 2025-08-21 RX ORDER — VALSARTAN ORAL 4 MG/ML
4 SOLUTION ORAL
Refills: 0 | Status: ACTIVE | COMMUNITY

## (undated) DEVICE — FOLEY TRAY 16FR 5CC LF UMETER CLOSED

## (undated) DEVICE — ELCTR BOVIE TIP BLADE INSULATED 6.5" EDGE

## (undated) DEVICE — DISSECTOR ENDOSCOPIC KITTNER SINGLE TIP

## (undated) DEVICE — ELCTR EXTENSION STRAIGHT

## (undated) DEVICE — DRAPE 3/4 SHEET 52X76"

## (undated) DEVICE — APPLICATOR FOR PROGEL EXTENDED SPRAY TIP 29CM

## (undated) DEVICE — VISITEC 4X4

## (undated) DEVICE — SUT VICRYL 0 27" CT-1 UNDYED

## (undated) DEVICE — SYR LUER LOK 10CC

## (undated) DEVICE — SUT PROLENE 0 30" CT-1

## (undated) DEVICE — DRSG CURITY GAUZE SPONGE 4 X 4" 12-PLY

## (undated) DEVICE — SUT MONOCRYL 4-0 27" PS-2 UNDYED

## (undated) DEVICE — DRAPE MAGNETIC INSTRUMENT MEDIUM

## (undated) DEVICE — DRAPE IOBAN 33" X 23"

## (undated) DEVICE — TAPE SILK 3"

## (undated) DEVICE — TRAP SPECIMEN SPUTUM 40CC

## (undated) DEVICE — TUBING SUCTION NONCONDUCTIVE 6MM X 12FT

## (undated) DEVICE — DRSG TELFA 3 X 8

## (undated) DEVICE — SOL ANTI FOG

## (undated) DEVICE — PROTECTOR HEEL / ELBOW FLUFFY

## (undated) DEVICE — DRSG BENZOIN 0.6CC

## (undated) DEVICE — SUT VICRYL 3-0 27" SH UNDYED

## (undated) DEVICE — CHEST DRAIN PLEUR-EVAC DRY/WET ADULT-PEDS SINGLE (QUICK)

## (undated) DEVICE — DRSG TEGADERM 4X4.75"

## (undated) DEVICE — NDL HYPO REGULAR BEVEL 25G X 1.5" (BLUE)

## (undated) DEVICE — SUT VICRYL 2-0 27" UR-6

## (undated) DEVICE — SPECIMEN CONTAINER 100ML

## (undated) DEVICE — PACK MAJOR ABDOMINAL W ENDO DRAPE

## (undated) DEVICE — POSITIONER STRAP ARMBOARD VELCRO TS-30

## (undated) DEVICE — CONNECTOR REDUCING STRAIGHT 3/8X0.25"

## (undated) DEVICE — SUT SILK 0 18" TIES

## (undated) DEVICE — SOL IRR POUR NS 0.9% 500ML

## (undated) DEVICE — SYR SLIP 10CC

## (undated) DEVICE — DRAPE MAYO STAND 23"

## (undated) DEVICE — DURABLE MEDICAL EQUIPMENT: Type: DURABLE MEDICAL EQUIPMENT

## (undated) DEVICE — WARMING BLANKET LOWER ADULT

## (undated) DEVICE — VALVE SUCTION EVIS 160/200/240

## (undated) DEVICE — PREP CHLORAPREP HI-LITE ORANGE 26ML

## (undated) DEVICE — ELCTR GROUNDING PAD ADULT COVIDIEN

## (undated) DEVICE — STAPLER ECHELON FLEX POWERED ADV PLCMT TIP

## (undated) DEVICE — ELCTR BOVIE TIP BLADE INSULATED 2.75" EDGE

## (undated) DEVICE — VENODYNE/SCD SLEEVE CALF MEDIUM

## (undated) DEVICE — DRSG STERISTRIPS 0.5 X 4"

## (undated) DEVICE — VALVE BIOPSY BRONCHOVIDEOSCOPE

## (undated) DEVICE — SUT SILK 2-0 30" TIES